# Patient Record
Sex: FEMALE | Race: WHITE | NOT HISPANIC OR LATINO | Employment: UNEMPLOYED | URBAN - METROPOLITAN AREA
[De-identification: names, ages, dates, MRNs, and addresses within clinical notes are randomized per-mention and may not be internally consistent; named-entity substitution may affect disease eponyms.]

---

## 2020-12-08 ENCOUNTER — HOSPITAL ENCOUNTER (OUTPATIENT)
Dept: URGENT CARE | Facility: CLINIC | Age: 40
Discharge: HOME | End: 2020-12-08
Attending: NURSE PRACTITIONER

## 2021-11-04 ENCOUNTER — HOSPITAL ENCOUNTER (EMERGENCY)
Facility: HOSPITAL | Age: 41
Discharge: HOME | End: 2021-11-04
Attending: EMERGENCY MEDICINE | Admitting: EMERGENCY MEDICINE

## 2021-11-04 ENCOUNTER — APPOINTMENT (OUTPATIENT)
Dept: CT IMAGING | Facility: HOSPITAL | Age: 41
End: 2021-11-04

## 2021-11-04 VITALS
HEART RATE: 87 BPM | RESPIRATION RATE: 18 BRPM | OXYGEN SATURATION: 99 % | SYSTOLIC BLOOD PRESSURE: 121 MMHG | WEIGHT: 129.19 LBS | TEMPERATURE: 98 F | BODY MASS INDEX: 23.77 KG/M2 | HEIGHT: 62 IN | DIASTOLIC BLOOD PRESSURE: 80 MMHG

## 2021-11-04 DIAGNOSIS — R10.84 GENERALIZED ABDOMINAL PAIN: Primary | ICD-10-CM

## 2021-11-04 DIAGNOSIS — F15.10 METHAMPHETAMINE ABUSE (HCC): ICD-10-CM

## 2021-11-04 LAB
027 TOXIN: NORMAL
ALBUMIN SERPL-MCNC: 4.6 G/DL (ref 3.5–5.2)
ALBUMIN/GLOB SERPL: 1.4 G/DL
ALP SERPL-CCNC: 100 U/L (ref 39–117)
ALT SERPL W P-5'-P-CCNC: 11 U/L (ref 1–33)
AMPHET+METHAMPHET UR QL: POSITIVE
ANION GAP SERPL CALCULATED.3IONS-SCNC: 12 MMOL/L (ref 5–15)
AST SERPL-CCNC: 14 U/L (ref 1–32)
BARBITURATES UR QL SCN: NEGATIVE
BASOPHILS # BLD AUTO: 0.05 10*3/MM3 (ref 0–0.2)
BASOPHILS NFR BLD AUTO: 0.2 % (ref 0–1.5)
BENZODIAZ UR QL SCN: NEGATIVE
BILIRUB SERPL-MCNC: 0.3 MG/DL (ref 0–1.2)
BILIRUB UR QL STRIP: NEGATIVE
BUN SERPL-MCNC: 9 MG/DL (ref 6–20)
BUN/CREAT SERPL: 12.5 (ref 7–25)
C DIFF TOX GENS STL QL NAA+PROBE: NEGATIVE
CALCIUM SPEC-SCNC: 9.7 MG/DL (ref 8.6–10.5)
CANNABINOIDS SERPL QL: NEGATIVE
CHLORIDE SERPL-SCNC: 103 MMOL/L (ref 98–107)
CLARITY UR: ABNORMAL
CO2 SERPL-SCNC: 22 MMOL/L (ref 22–29)
COCAINE UR QL: NEGATIVE
COLOR UR: YELLOW
CREAT SERPL-MCNC: 0.72 MG/DL (ref 0.57–1)
DEPRECATED RDW RBC AUTO: 42.5 FL (ref 37–54)
EOSINOPHIL # BLD AUTO: 0.1 10*3/MM3 (ref 0–0.4)
EOSINOPHIL NFR BLD AUTO: 0.5 % (ref 0.3–6.2)
ERYTHROCYTE [DISTWIDTH] IN BLOOD BY AUTOMATED COUNT: 12.6 % (ref 12.3–15.4)
GFR SERPL CREATININE-BSD FRML MDRD: 88 ML/MIN/1.73
GLOBULIN UR ELPH-MCNC: 3.2 GM/DL
GLUCOSE SERPL-MCNC: 116 MG/DL (ref 65–99)
GLUCOSE UR STRIP-MCNC: NEGATIVE MG/DL
HCT VFR BLD AUTO: 44.1 % (ref 34–46.6)
HGB BLD-MCNC: 14.9 G/DL (ref 12–15.9)
HGB UR QL STRIP.AUTO: NEGATIVE
HOLD SPECIMEN: NORMAL
HOLD SPECIMEN: NORMAL
IMM GRANULOCYTES # BLD AUTO: 0.09 10*3/MM3 (ref 0–0.05)
IMM GRANULOCYTES NFR BLD AUTO: 0.4 % (ref 0–0.5)
KETONES UR QL STRIP: NEGATIVE
LEUKOCYTE ESTERASE UR QL STRIP.AUTO: NEGATIVE
LIPASE SERPL-CCNC: 25 U/L (ref 13–60)
LYMPHOCYTES # BLD AUTO: 1.58 10*3/MM3 (ref 0.7–3.1)
LYMPHOCYTES NFR BLD AUTO: 7.7 % (ref 19.6–45.3)
MCH RBC QN AUTO: 30.8 PG (ref 26.6–33)
MCHC RBC AUTO-ENTMCNC: 33.8 G/DL (ref 31.5–35.7)
MCV RBC AUTO: 91.3 FL (ref 79–97)
METHADONE UR QL SCN: NEGATIVE
MONOCYTES # BLD AUTO: 0.79 10*3/MM3 (ref 0.1–0.9)
MONOCYTES NFR BLD AUTO: 3.8 % (ref 5–12)
NEUTROPHILS NFR BLD AUTO: 17.92 10*3/MM3 (ref 1.7–7)
NEUTROPHILS NFR BLD AUTO: 87.4 % (ref 42.7–76)
NITRITE UR QL STRIP: NEGATIVE
NRBC BLD AUTO-RTO: 0 /100 WBC (ref 0–0.2)
OPIATES UR QL: NEGATIVE
OXYCODONE UR QL SCN: NEGATIVE
PH UR STRIP.AUTO: 5.5 [PH] (ref 5–8)
PLATELET # BLD AUTO: 455 10*3/MM3 (ref 140–450)
PMV BLD AUTO: 9.3 FL (ref 6–12)
POTASSIUM SERPL-SCNC: 3.9 MMOL/L (ref 3.5–5.2)
PROT SERPL-MCNC: 7.8 G/DL (ref 6–8.5)
PROT UR QL STRIP: NEGATIVE
RBC # BLD AUTO: 4.83 10*6/MM3 (ref 3.77–5.28)
SODIUM SERPL-SCNC: 137 MMOL/L (ref 136–145)
SP GR UR STRIP: 1.01 (ref 1–1.03)
UROBILINOGEN UR QL STRIP: ABNORMAL
WBC # BLD AUTO: 20.53 10*3/MM3 (ref 3.4–10.8)
WHOLE BLOOD HOLD SPECIMEN: NORMAL
WHOLE BLOOD HOLD SPECIMEN: NORMAL

## 2021-11-04 PROCEDURE — 80307 DRUG TEST PRSMV CHEM ANLYZR: CPT | Performed by: EMERGENCY MEDICINE

## 2021-11-04 PROCEDURE — 83690 ASSAY OF LIPASE: CPT | Performed by: EMERGENCY MEDICINE

## 2021-11-04 PROCEDURE — 80053 COMPREHEN METABOLIC PANEL: CPT | Performed by: EMERGENCY MEDICINE

## 2021-11-04 PROCEDURE — 99283 EMERGENCY DEPT VISIT LOW MDM: CPT

## 2021-11-04 PROCEDURE — 87493 C DIFF AMPLIFIED PROBE: CPT | Performed by: EMERGENCY MEDICINE

## 2021-11-04 PROCEDURE — 74177 CT ABD & PELVIS W/CONTRAST: CPT

## 2021-11-04 PROCEDURE — 81003 URINALYSIS AUTO W/O SCOPE: CPT | Performed by: EMERGENCY MEDICINE

## 2021-11-04 PROCEDURE — 25010000002 DICYCLOMINE PER 20 MG: Performed by: EMERGENCY MEDICINE

## 2021-11-04 PROCEDURE — 85025 COMPLETE CBC W/AUTO DIFF WBC: CPT | Performed by: EMERGENCY MEDICINE

## 2021-11-04 PROCEDURE — 96372 THER/PROPH/DIAG INJ SC/IM: CPT

## 2021-11-04 PROCEDURE — 0 IOPAMIDOL PER 1 ML: Performed by: EMERGENCY MEDICINE

## 2021-11-04 PROCEDURE — 25010000002 KETOROLAC TROMETHAMINE PER 15 MG: Performed by: EMERGENCY MEDICINE

## 2021-11-04 PROCEDURE — 36415 COLL VENOUS BLD VENIPUNCTURE: CPT | Performed by: EMERGENCY MEDICINE

## 2021-11-04 PROCEDURE — 87506 IADNA-DNA/RNA PROBE TQ 6-11: CPT | Performed by: EMERGENCY MEDICINE

## 2021-11-04 PROCEDURE — 96374 THER/PROPH/DIAG INJ IV PUSH: CPT

## 2021-11-04 RX ORDER — DICYCLOMINE HYDROCHLORIDE 10 MG/ML
20 INJECTION INTRAMUSCULAR ONCE
Status: COMPLETED | OUTPATIENT
Start: 2021-11-04 | End: 2021-11-04

## 2021-11-04 RX ORDER — SODIUM CHLORIDE 0.9 % (FLUSH) 0.9 %
10 SYRINGE (ML) INJECTION AS NEEDED
Status: DISCONTINUED | OUTPATIENT
Start: 2021-11-04 | End: 2021-11-04 | Stop reason: HOSPADM

## 2021-11-04 RX ORDER — KETOROLAC TROMETHAMINE 30 MG/ML
30 INJECTION, SOLUTION INTRAMUSCULAR; INTRAVENOUS ONCE
Status: COMPLETED | OUTPATIENT
Start: 2021-11-04 | End: 2021-11-04

## 2021-11-04 RX ORDER — ONDANSETRON 8 MG/1
8 TABLET, ORALLY DISINTEGRATING ORAL EVERY 8 HOURS PRN
Qty: 20 TABLET | Refills: 0 | Status: SHIPPED | OUTPATIENT
Start: 2021-11-04

## 2021-11-04 RX ORDER — DICYCLOMINE HCL 20 MG
20 TABLET ORAL 4 TIMES DAILY PRN
Qty: 20 TABLET | Refills: 0 | Status: SHIPPED | OUTPATIENT
Start: 2021-11-04

## 2021-11-04 RX ADMIN — SODIUM CHLORIDE 1000 ML: 9 INJECTION, SOLUTION INTRAVENOUS at 14:23

## 2021-11-04 RX ADMIN — KETOROLAC TROMETHAMINE 30 MG: 30 INJECTION, SOLUTION INTRAMUSCULAR at 14:23

## 2021-11-04 RX ADMIN — DICYCLOMINE HYDROCHLORIDE 20 MG: 20 INJECTION, SOLUTION INTRAMUSCULAR at 14:23

## 2021-11-04 RX ADMIN — IOPAMIDOL 100 ML: 755 INJECTION, SOLUTION INTRAVENOUS at 16:59

## 2021-11-04 NOTE — DISCHARGE INSTRUCTIONS
Rest at home.  Drink plenty of fluids.  [place] diet.  Avoid illegal or prescription drug abuse  Follow-up with primary care in 1 week if symptoms or not better.  Return to the ER for increasing abdominal pain, persistent vomiting, passing blood in vomit or stool or any other concerns issues that may arise.

## 2021-11-04 NOTE — ED PROVIDER NOTES
Time: 1:48 PM EDT  Arrived by: Private car  Chief Complaint:   Chief Complaint   Patient presents with   • Abdominal Pain     History provided by: Patient and family  History is limited by: N/A     History of Present Illness:    Lauren Lopez is a 41 y.o. female who presents to the emergency department today with complaints of abdominal pain. Around 1000 this morning, the patient reports that she developed severe squeezing cramps to her generalized abdomen. The patient does have some relief with lying on her side. She has had mild vomiting as well as diarrhea since the onset of her pain. She denies prior instances of these symptoms.    The patient denies taking any home medications. She smokes a pack of cigarettes per day but denies alcohol use. She does have a history of methamphetamine use with last use approximately one month ago per the family, though they deny current drug use. There are no other acute complaints at this time.      History provided by:  Patient and relative   used: No    Abdominal Pain  Pain location:  Generalized  Pain quality: cramping and squeezing    Pain radiates to:  Does not radiate  Pain severity:  Severe  Onset quality:  Sudden  Duration:  4 hours  Timing:  Intermittent  Progression:  Unchanged  Chronicity:  New  Context comment:  Patient developed squeezing cramps to her abdomen this morning.  Relieved by:  Lying down (Lying on her side)  Exacerbated by: Moving from her side.  Associated symptoms: diarrhea and vomiting (mild)    Associated symptoms: no chest pain, no chills, no cough, no dysuria, no fever and no shortness of breath    Risk factors: no alcohol abuse and not elderly        Similar Symptoms Previously: No.  Recently seen: Patient's most recent visit on file was in urgent care on 12/8/2021. He received an x-ray of the right ankle during this visit, though no further information is on record at this time.      Patient Care Team  Primary Care  "Provider: Provider, No Known    Past Medical History:     No Known Allergies  History reviewed. No pertinent past medical history.  Past Surgical History:   Procedure Laterality Date   • HYSTERECTOMY     • TONSILLECTOMY       History reviewed. No pertinent family history.    Home Medications:  Prior to Admission medications    Not on File        Social History:   Social History     Tobacco Use   • Smoking status: Current Every Day Smoker   • Smokeless tobacco: Never Used   Vaping Use   • Vaping Use: Never used   Substance Use Topics   • Alcohol use: Not Currently   • Drug use: Yes     Types: Methamphetamines     Recent travel: not applicable     Review of Systems:  Review of Systems   Constitutional: Negative for chills and fever.   HENT: Negative for nosebleeds.    Eyes: Negative for redness.   Respiratory: Negative for cough and shortness of breath.    Cardiovascular: Negative for chest pain.   Gastrointestinal: Positive for abdominal pain, diarrhea and vomiting (mild).   Genitourinary: Negative for dysuria and frequency.   Musculoskeletal: Negative for back pain and neck pain.   Skin: Negative for rash.   Neurological: Negative for seizures and syncope.   All other systems reviewed and are negative.       Physical Exam:  /79   Pulse 90   Temp 98.3 °F (36.8 °C) (Oral)   Resp 18   Ht 157.5 cm (62\")   Wt 58.6 kg (129 lb 3 oz)   SpO2 98%   BMI 23.63 kg/m²     Physical Exam  Vitals and nursing note reviewed.   Constitutional:       General: She is in acute distress.      Comments: She is lying on her left side. She is in quite a bit of distress from her abdominal pain.   HENT:      Head: Normocephalic and atraumatic.      Nose: Nose normal.      Mouth/Throat:      Mouth: Mucous membranes are moist.   Eyes:      General: No scleral icterus.  Cardiovascular:      Rate and Rhythm: Normal rate and regular rhythm.      Heart sounds: Normal heart sounds. No murmur heard.      Pulmonary:      Effort: No " respiratory distress.      Breath sounds: Normal breath sounds.      Comments: She is hyperventilating due to pain.  Abdominal:      General: Bowel sounds are normal.      Palpations: Abdomen is soft.      Tenderness: There is generalized abdominal tenderness. There is no guarding or rebound.   Musculoskeletal:         General: No tenderness. Normal range of motion.      Cervical back: Normal range of motion and neck supple.      Right lower leg: No edema.      Left lower leg: No edema.   Skin:     General: Skin is warm and dry.   Neurological:      Mental Status: She is alert. Mental status is at baseline.   Psychiatric:         Behavior: Behavior normal.                Medications in the Emergency Department:  Medications   sodium chloride 0.9 % flush 10 mL (has no administration in time range)   sodium chloride 0.9 % bolus 1,000 mL (0 mL Intravenous Stopped 11/4/21 1520)   ketorolac (TORADOL) injection 30 mg (30 mg Intravenous Given 11/4/21 1423)   dicyclomine (BENTYL) injection 20 mg (20 mg Intramuscular Given 11/4/21 1423)   iopamidol (ISOVUE-370) 76 % injection 100 mL (100 mL Intravenous Given 11/4/21 1659)        Labs  Labs Reviewed   COMPREHENSIVE METABOLIC PANEL - Abnormal; Notable for the following components:       Result Value    Glucose 116 (*)     All other components within normal limits    Narrative:     GFR Normal >60  Chronic Kidney Disease <60  Kidney Failure <15     URINALYSIS W/ MICROSCOPIC IF INDICATED (NO CULTURE) - Abnormal; Notable for the following components:    Appearance, UA Turbid (*)     All other components within normal limits    Narrative:     Urine microscopic not indicated.   CBC WITH AUTO DIFFERENTIAL - Abnormal; Notable for the following components:    WBC 20.53 (*)     Platelets 455 (*)     Neutrophil % 87.4 (*)     Lymphocyte % 7.7 (*)     Monocyte % 3.8 (*)     Neutrophils, Absolute 17.92 (*)     Immature Grans, Absolute 0.09 (*)     All other components within normal limits    URINE DRUG SCREEN - Abnormal; Notable for the following components:    Amphet/Methamphet, Screen Positive (*)     All other components within normal limits    Narrative:     Negative Thresholds Per Drugs Screened:    Amphetamines                 500 ng/ml  Barbiturates                 200 ng/ml  Benzodiazepines              100 ng/ml  Cocaine                      300 ng/ml  Methadone                    300 ng/ml  Opiates                      300 ng/ml  Oxycodone                    100 ng/ml  THC                           50 ng/ml    The Normal Value for all drugs tested is negative. This report includes final unconfirmed screening results to be used for medical treatment purposes only. Unconfirmed results must not be used for non-medical purposes such as employment or legal testing. Clinical consideration should be applied to any drug of abuse test, particularly when unconfirmed results are used.           LIPASE - Normal   ENTERIC BACTERIAL PANEL (GARRY)   ENTERIC PARASITE PANEL (GARRY)   CLOSTRIDIUM DIFFICILE TOXIN, PCR   RAINBOW DRAW    Narrative:     The following orders were created for panel order [place] Draw.  Procedure                               Abnormality         Status                     ---------                               -----------         ------                     Green Top (Gel)[[id number]]                                  Final result               Lavender Top[[id number]]                                     Final result               Gold Top - SST[[id number]]                                   Final result               Light Blue Top[[id number]]                                   Final result                 Please view results for these tests on the individual orders.   CBC AND DIFFERENTIAL    Narrative:     The following orders were created for panel order CBC & Differential.  Procedure                               Abnormality         Status                     ---------                                -----------         ------                     CBC Auto Differential[[id number]]        Abnormal            Final result                 Please view results for these tests on the individual orders.   GREEN TOP   LAVENDER TOP   GOLD TOP - SST   LIGHT BLUE TOP        Imaging:  CT Abdomen Pelvis With Contrast    Result Date: 11/4/2021  PROCEDURE: CT ABDOMEN PELVIS W CONTRAST  COMPARISON: [facility], CT, ABDOMEN/PELVIS WITH CONTRAST, 8/13/2015, 22:59.  INDICATIONS: right abdominal pain since earlier today  PROTOCOL:   Standard imaging protocol performed    RADIATION:   DLP: 479.1mGy*cm   Automated exposure control was utilized to minimize radiation dose. CONTRAST: 100cc Isovue 370 I.V.  TECHNIQUE: Axial images of the abdomen and pelvis with intravenous and oral contrast.  ABDOMEN:  The lung bases are clear.  The liver, spleen, pancreas, adrenal glands and kidneys are normal.  There are no inflammatory changes around the gallbladder.  PELVIS:  No evidence of bowel obstruction, perforation or abscess.  The appendix and terminal ileum are normal.  Prior hysterectomy.  There is reversal of the jejunoileal fold pattern.  No CT evidence of colitis.  The abdominal aorta has a normal caliber.  IMPRESSION:  Reversal of the jejunoileal fold pattern.  Suggest correlation with any history of Celiac disease.  LISE NOEL MD       Electronically Signed and Approved By: LISE NOEL MD on 11/04/2021 at 17:23               Procedures:  Procedures    Progress                      The patient was seen evaluated ED by me.  The above history and physical examination was performed as document.  The diagnostic data is obtained.  Results reviewed.  Discussed with the patient.  Upon repeat examination at 1815 the patient's pain is dramatically improved she is resting comfortably.  Upon direct questioning again now the patient changes her story and does admit to recent methamphetamine use.  I explained her the CT  showed no evidence of any acute intra abdominal pathology.  Patient for discharge home with outpatient follow-up.      Medical Decision Making:  MDM     Final diagnoses:   Generalized abdominal pain   Methamphetamine abuse (HCC)        Disposition:  ED Disposition     ED Disposition Condition Comment    Discharge Stable           Documentation assistance provided by Idania Ewing acting as scribe for Dr. Luis Manuel Acuña. Information recorded by the scribe was done at my direction and has been verified and validated by me.        Idania Ewing  11/04/21 1414       Idania Ewing  11/04/21 1422       Idania Ewing  11/04/21 1708       Luis Manuel Acuña DO  11/04/21 [id number]

## 2021-11-05 LAB
C COLI+JEJ+UPSA DNA STL QL NAA+NON-PROBE: NOT DETECTED
CRYPTOSP DNA STL QL NAA+NON-PROBE: NOT DETECTED
E HISTOLYT DNA STL QL NAA+NON-PROBE: NOT DETECTED
EC STX1+STX2 GENES STL QL NAA+NON-PROBE: NOT DETECTED
G LAMBLIA DNA STL QL NAA+NON-PROBE: NOT DETECTED
S ENT+BONG DNA STL QL NAA+NON-PROBE: NOT DETECTED
SHIGELLA SP+EIEC IPAH ST NAA+NON-PROBE: NOT DETECTED

## 2022-10-21 ENCOUNTER — OFFICE VISIT (OUTPATIENT)
Dept: ORTHOPEDIC SURGERY | Facility: CLINIC | Age: 42
End: 2022-10-21

## 2022-10-21 VITALS — WEIGHT: 142 LBS | HEART RATE: 109 BPM | HEIGHT: 62 IN | BODY MASS INDEX: 26.13 KG/M2 | OXYGEN SATURATION: 99 %

## 2022-10-21 DIAGNOSIS — M25.511 RIGHT SHOULDER PAIN, UNSPECIFIED CHRONICITY: Primary | ICD-10-CM

## 2022-10-21 DIAGNOSIS — S46.911A STRAIN OF RIGHT SHOULDER, INITIAL ENCOUNTER: ICD-10-CM

## 2022-10-21 PROCEDURE — 99203 OFFICE O/P NEW LOW 30 MIN: CPT | Performed by: ORTHOPAEDIC SURGERY

## 2022-10-21 RX ORDER — DICLOFENAC SODIUM 75 MG/1
75 TABLET, DELAYED RELEASE ORAL 2 TIMES DAILY
Qty: 60 TABLET | Refills: 1 | Status: SHIPPED | OUTPATIENT
Start: 2022-10-21

## 2022-10-21 NOTE — PROGRESS NOTES
"Chief Complaint  Initial Evaluation of the Right Scapula     Subjective      Lauren Lopez presents to [facility] for evaluation of the right shoulder. The patient injured her right shoulder when she took out the trash about 6 days ago. She was seen and evaluated at [facility] and placed into a sling. She reports pain with ROM. She was prescribed prednisone.     No Known Allergies     Social History     Socioeconomic History   • Marital status:    Tobacco Use   • Smoking status: Every Day     Packs/day: 1.00     Types: Cigarettes   • Smokeless tobacco: Never   Vaping Use   • Vaping Use: Never used   Substance and Sexual Activity   • Alcohol use: Not Currently   • Drug use: Not Currently     Types: Methamphetamines   • Sexual activity: Defer        Review of Systems     Objective   Vital Signs:   Pulse 109   Ht 157.5 cm (62\")   Wt 64.4 kg (142 lb)   SpO2 99%   BMI 25.97 kg/m²       Physical Exam  Constitutional:       Appearance: Normal appearance. The patient is well-developed and normal weight.   HENT:      Head: Normocephalic.      Right Ear: Hearing and external ear normal.      Left Ear: Hearing and external ear normal.      Nose: Nose normal.   Eyes:      Conjunctiva/sclera: Conjunctivae normal.   Cardiovascular:      Rate and Rhythm: Normal rate.   Pulmonary:      Effort: Pulmonary effort is normal.      Breath sounds: No wheezing or rales.   Abdominal:      Palpations: Abdomen is soft.      Tenderness: There is no abdominal tenderness.   Musculoskeletal:      Cervical back: Normal range of motion.   Skin:     Findings: No rash.   Neurological:      Mental Status: The patient is alert and oriented to person, place, and time.   Psychiatric:         Mood and Affect: Mood and affect normal.         Judgment: Judgment normal.       Ortho Exam      Right shoulder- non-tender. No skin discoloration, atrophy or swelling. Forward elevation passive 165 with pain. Abduction 125 " with pain. External Rotation 75. Internal rotation 35 with pain. Positive impingement signs. 4+ supraspinatus strength. 5/5 infraspinatus and subscapularis. Internal rotation to L-5. Negative cross arm adduction     Procedures    X-Ray Report:  Right scapula(s) X-Ray  Indication: Evaluation of right shoulder pain  AP/Lateral view(s)  Findings: no acute fracture.   Prior studies available for comparison: no         Imaging Results (Most Recent)     Procedure Component Value Units Date/Time    XR Scapula Right [[id number]] Resulted: 10/21/22 1425     Updated: 10/21/22 1427           Result Review :       No results found.           Assessment and Plan     Diagnoses and all orders for this visit:    1. Right shoulder pain, unspecified chronicity (Primary)  -     XR Scapula Right    2. Strain of right shoulder, initial encounter        Discussed the treatment plan with the patient.  I reviewed the x-rays that were obtained today with the patient. Plan for conservative treatment at this time. Prescription for diclofenac given today. Home exercises given today. May consider MRI if symptoms persist.     Call or return if worsening symptoms.    Follow Up     4 weeks      Patient was given instructions and counseling regarding her condition or for health maintenance advice. Please see specific information pulled into the AVS if appropriate.     Scribed for Giovanni Calixto MD by Kellie Rocha.  10/21/22   14:34 EDT    I have personally performed the services described in this document as scribed by the above individual and it is both accurate and complete. Giovanni Calixto MD 10/22/22

## 2022-11-17 ENCOUNTER — OFFICE VISIT (OUTPATIENT)
Dept: ORTHOPEDIC SURGERY | Facility: CLINIC | Age: 42
End: 2022-11-17

## 2022-11-17 VITALS — BODY MASS INDEX: 25.58 KG/M2 | OXYGEN SATURATION: 100 % | HEART RATE: 118 BPM | HEIGHT: 62 IN | WEIGHT: 139 LBS

## 2022-11-17 DIAGNOSIS — S46.911A STRAIN OF RIGHT SHOULDER, INITIAL ENCOUNTER: ICD-10-CM

## 2022-11-17 DIAGNOSIS — M25.511 RIGHT SHOULDER PAIN, UNSPECIFIED CHRONICITY: Primary | ICD-10-CM

## 2022-11-17 PROCEDURE — 99213 OFFICE O/P EST LOW 20 MIN: CPT | Performed by: PHYSICIAN ASSISTANT

## 2022-11-17 NOTE — PROGRESS NOTES
"Chief Complaint  Pain and Follow-up of the Right Shoulder    Subjective          Lauren Lopez is a 42 y.o. female  presents to [facility] for   History of Present Illness      Patient presents for follow-up evaluation right shoulder pain, right shoulder injury she hurt her shoulder mid October she was evaluated at [facility] placed into a sling, Dr. Calixto saw her on 10/21/2022.  He discussed conservative treatment with diclofenac and home exercises.  She states her shoulder has not improved she has been taking the diclofenac she takes 2 and 1 dose to help with her pain.  She states she is moving soon from house and states that is packing and getting ready is increasing her pain she also has pain at night she points anterior lateral shoulder as areas of worst pain.  No Known Allergies     Social History     Socioeconomic History   • Marital status:    Tobacco Use   • Smoking status: Every Day     Packs/day: 1.00     Types: Cigarettes   • Smokeless tobacco: Never   Vaping Use   • Vaping Use: Never used   Substance and Sexual Activity   • Alcohol use: Not Currently   • Drug use: Not Currently     Types: Methamphetamines   • Sexual activity: Defer        REVIEW OF SYSTEMS    Constitutional: Denies fevers, chills, weight loss  Cardiovascular: Denies chest pain, shortness of breath  Skin: Denies rashes, acute skin changes  Neurologic: Denies headache, loss of consciousness  MSK: Right shoulder pain      Objective   Vital Signs:   Pulse 118   Ht 157.5 cm (62\")   Wt 63 kg (139 lb)   SpO2 100%   BMI 25.42 kg/m²     Body mass index is 25.42 kg/m².    Physical Exam    Right shoulder: Tender to palpation anterior lateral shoulder, skin is intact.  Active forward elevation 95 with pain, active abduction 85 with pain, external rotation 75, internal rotation rotation 35.  Neurovascular intact 4+ supraspinatus 5 out of 5 infraspinatus and subscapularis, positive impingement testing, " internal rotation L5, negative crossarm adduction    Procedures    Imaging Results (Most Recent)     None           Result Review :   The following data was reviewed by: LEE Chinchilla on 11/17/2022:               Assessment and Plan    Diagnoses and all orders for this visit:    1. Right shoulder pain, unspecified chronicity (Primary)  -     MRI Shoulder Right Without Contrast; Future    2. Strain of right shoulder, initial encounter  -     MRI Shoulder Right Without Contrast; Future        Discussed diagnosis and treatment options with the patient she was advised we recommend MRI of the right shoulder for further evaluation we discussed this right shoulder steroid injection today she declined she states she does not like needles follow-up after MRI    Call or return if worsening symptoms.    Follow Up   Return for After MRI.  Patient was given instructions and counseling regarding her condition or for health maintenance advice. Please see specific information pulled into the AVS if appropriate.

## 2022-12-16 ENCOUNTER — HOSPITAL ENCOUNTER (OUTPATIENT)
Dept: MRI IMAGING | Facility: HOSPITAL | Age: 42
Discharge: HOME | End: 2022-12-16
Admitting: PHYSICIAN ASSISTANT

## 2022-12-16 DIAGNOSIS — M25.511 RIGHT SHOULDER PAIN, UNSPECIFIED CHRONICITY: ICD-10-CM

## 2022-12-16 DIAGNOSIS — S46.911A STRAIN OF RIGHT SHOULDER, INITIAL ENCOUNTER: ICD-10-CM

## 2022-12-16 PROCEDURE — 73221 MRI JOINT UPR EXTREM W/O DYE: CPT

## 2022-12-22 ENCOUNTER — OFFICE VISIT (OUTPATIENT)
Dept: ORTHOPEDIC SURGERY | Facility: CLINIC | Age: 42
End: 2022-12-22

## 2022-12-22 VITALS — BODY MASS INDEX: 25.58 KG/M2 | WEIGHT: 139 LBS | OXYGEN SATURATION: 100 % | HEIGHT: 62 IN

## 2022-12-22 DIAGNOSIS — S46.911D STRAIN OF RIGHT SHOULDER, SUBSEQUENT ENCOUNTER: ICD-10-CM

## 2022-12-22 DIAGNOSIS — M25.511 RIGHT SHOULDER PAIN, UNSPECIFIED CHRONICITY: Primary | ICD-10-CM

## 2022-12-22 DIAGNOSIS — M75.111 PARTIAL NONTRAUMATIC TEAR OF RIGHT ROTATOR CUFF: ICD-10-CM

## 2022-12-22 DIAGNOSIS — M75.51 ACUTE BURSITIS OF RIGHT SHOULDER: ICD-10-CM

## 2022-12-22 PROCEDURE — 20610 DRAIN/INJ JOINT/BURSA W/O US: CPT | Performed by: PHYSICIAN ASSISTANT

## 2022-12-22 PROCEDURE — 99213 OFFICE O/P EST LOW 20 MIN: CPT | Performed by: PHYSICIAN ASSISTANT

## 2022-12-22 RX ORDER — TRIAMCINOLONE ACETONIDE 40 MG/ML
40 INJECTION, SUSPENSION INTRA-ARTICULAR; INTRAMUSCULAR
Status: COMPLETED | OUTPATIENT
Start: 2022-12-22 | End: 2022-12-22

## 2022-12-22 RX ORDER — LIDOCAINE HYDROCHLORIDE 10 MG/ML
5 INJECTION, SOLUTION INFILTRATION; PERINEURAL
Status: COMPLETED | OUTPATIENT
Start: 2022-12-22 | End: 2022-12-22

## 2022-12-22 RX ORDER — IBUPROFEN 600 MG/1
600 TABLET ORAL EVERY 6 HOURS PRN
COMMUNITY

## 2022-12-22 RX ADMIN — TRIAMCINOLONE ACETONIDE 40 MG: 40 INJECTION, SUSPENSION INTRA-ARTICULAR; INTRAMUSCULAR at 16:14

## 2022-12-22 RX ADMIN — LIDOCAINE HYDROCHLORIDE 5 ML: 10 INJECTION, SOLUTION INFILTRATION; PERINEURAL at 16:14

## 2022-12-22 NOTE — PROGRESS NOTES
"Chief Complaint  Pain and Follow-up of the Right Shoulder    Subjective          Lauren Lopez is a 42 y.o. female  presents to [facility] for   History of Present Illness      Patient presents for follow-up evaluation of right shoulder pain, right shoulder injury, she hurt her shoulder mid October.  She was seen by Dr. Calixto on 10/21/2022, she has tried diclofenac and home exercises at last visit she had no relief with the injection and medicine was not helping her.  She has not done formal therapy and she has refused injections in the past.  Patient had MRI ordered at last visit she is here to review this today.  Patient states she has limited range of motion and pain with range of motion she also admits to pain at night after long day of activity.      No Known Allergies     Social History     Socioeconomic History   • Marital status:    Tobacco Use   • Smoking status: Every Day     Packs/day: 1.00     Types: Cigarettes   • Smokeless tobacco: Never   Vaping Use   • Vaping Use: Never used   Substance and Sexual Activity   • Alcohol use: Not Currently   • Drug use: Not Currently     Types: Methamphetamines   • Sexual activity: Defer        REVIEW OF SYSTEMS    Constitutional: Denies fevers, chills, weight loss  Cardiovascular: Denies chest pain, shortness of breath  Skin: Denies rashes, acute skin changes  Neurologic: Denies headache, loss of consciousness  MSK: Right shoulder pain      Objective   Vital Signs:   Ht 157.5 cm (62\")   Wt 63 kg (139 lb)   SpO2 100%   BMI 25.42 kg/m²     Body mass index is 25.42 kg/m².    Physical Exam    Right shoulder: Tender palpation anterior lateral shoulder, tender at the AC joint, skin is intact, active forward elevation 140 with pain, active abduction 85 with pain, external rotation 75, internal rotation 35, neurovascular intact, 4+ supraspinatus 5 out of 5 infraspinatus and subscapularis, pause impingement testing, internal " rotation L5 negative crossarm adduction.    Large Joint Arthrocentesis  Date/Time: 12/22/2022 4:14 PM  Consent given by: patient  Site marked: site marked  Timeout: Immediately prior to procedure a time out was called to verify the correct patient, procedure, equipment, support staff and site/side marked as required   Supporting Documentation  Indications: pain   Procedure Details  Location: shoulder (right) -   Needle gauge: 21g.  Medications administered: 5 mL lidocaine 1 %; 40 mg triamcinolone acetonide 40 MG/ML  Patient tolerance: patient tolerated the procedure well with no immediate complications          Imaging Results (Most Recent)     None           Result Review :   The following data was reviewed by: LEE Chinchilla on 12/22/2022:               Assessment and Plan    Diagnoses and all orders for this visit:    1. Right shoulder pain, unspecified chronicity (Primary)  -     Ambulatory Referral to Physical Therapy Evaluate and treat (2-3x/week for 6-8 weeks)    2. Strain of right shoulder, subsequent encounter  -     Ambulatory Referral to Physical Therapy Evaluate and treat (2-3x/week for 6-8 weeks)    3. Partial nontraumatic tear of right rotator cuff  -     Ambulatory Referral to Physical Therapy Evaluate and treat (2-3x/week for 6-8 weeks)    4. Acute bursitis of right shoulder  -     Ambulatory Referral to Physical Therapy Evaluate and treat (2-3x/week for 6-8 weeks)        Discussed diagnosis and treatment options with the patient we discussed conservative versus surgical intervention, we discussed right shoulder steroid injection today which she elected to have and tolerated well she will start physical therapy work on range of motion, strength follow-up in 6 weeks for recheck if no improvement may consider surgery discussion.    Call or return if worsening symptoms.    Follow Up   Return in about 6 weeks (around 2/2/2023) for Recheck.  Patient was given instructions and counseling  regarding her condition or for health maintenance advice. Please see specific information pulled into the AVS if appropriate.

## 2022-12-22 NOTE — PROGRESS NOTES
"Chief Complaint  Pain and Follow-up of the Right Shoulder    Subjective     {Problem List  Visit Diagnosis   Encounters  Notes  Medications  Labs  Result Review Imaging  Media :23}     Lauren Lopez is a 42 y.o. female  presents to [facility] for   History of Present Illness      ***      No Known Allergies     Social History     Socioeconomic History   • Marital status:    Tobacco Use   • Smoking status: Every Day     Packs/day: 1.00     Types: Cigarettes   • Smokeless tobacco: Never   Vaping Use   • Vaping Use: Never used   Substance and Sexual Activity   • Alcohol use: Not Currently   • Drug use: Not Currently     Types: Methamphetamines   • Sexual activity: Defer        REVIEW OF SYSTEMS    Constitutional: Denies fevers, chills, weight loss  Cardiovascular: Denies chest pain, shortness of breath  Skin: Denies rashes, acute skin changes  Neurologic: Denies headache, loss of consciousness  MSK: ***      Objective   Vital Signs:   Ht 157.5 cm (62\")   Wt 63 kg (139 lb)   SpO2 100%   BMI 25.42 kg/m²     Body mass index is 25.42 kg/m².    Physical Exam    ***    Large Joint Arthrocentesis  Date/Time: 12/22/2022 4:03 PM  Consent given by: patient  Site marked: site marked  Timeout: Immediately prior to procedure a time out was called to verify the correct patient, procedure, equipment, support staff and site/side marked as required   Supporting Documentation  Indications: pain   Procedure Details  Location: shoulder (right) -   Needle gauge: 21g.  Medications administered: 5 mL lidocaine 1 %; 40 mg triamcinolone acetonide 40 MG/ML  Patient tolerance: patient tolerated the procedure well with no immediate complications          Imaging Results (Most Recent)     None           Result Review :{Labs  Result Review  Imaging  Med Tab  Media  Procedures :23}   The following data was reviewed by: Eliza Sr MA on 12/22/2022:  {Data reviewed (Optional):29760:::1} "             Assessment and Plan {CC Problem List  Visit Diagnosis   ROS  Review (Popup)  Health Maintenance  Quality  BestPractice  Medications  SmartSets  SnapShot Encounters  Media :23}   Diagnoses and all orders for this visit:    1. Right shoulder pain, unspecified chronicity (Primary)  -     Ambulatory Referral to Physical Therapy Evaluate and treat (2-3x/week for 6-8 weeks)    2. Strain of right shoulder, subsequent encounter  -     Ambulatory Referral to Physical Therapy Evaluate and treat (2-3x/week for 6-8 weeks)    3. Partial nontraumatic tear of right rotator cuff  -     Ambulatory Referral to Physical Therapy Evaluate and treat (2-3x/week for 6-8 weeks)    4. Acute bursitis of right shoulder  -     Ambulatory Referral to Physical Therapy Evaluate and treat (2-3x/week for 6-8 weeks)        ***    {Ortho Plan:40615}    Follow Up {Instructions Charge Capture  Follow-up Communications :23}  Return in about 6 weeks (around 2/2/2023) for Recheck.  Patient was given instructions and counseling regarding her condition or for health maintenance advice. Please see specific information pulled into the AVS if appropriate.

## 2023-03-13 VITALS
HEART RATE: 98 BPM | OXYGEN SATURATION: 100 % | TEMPERATURE: 98.1 F | SYSTOLIC BLOOD PRESSURE: 143 MMHG | HEIGHT: 62 IN | BODY MASS INDEX: 23.69 KG/M2 | WEIGHT: 128.75 LBS | DIASTOLIC BLOOD PRESSURE: 83 MMHG | RESPIRATION RATE: 18 BRPM

## 2023-03-13 LAB
ALBUMIN SERPL-MCNC: 4.4 G/DL (ref 3.5–5.2)
ALBUMIN/GLOB SERPL: 1.4 G/DL
ALP SERPL-CCNC: 113 U/L (ref 39–117)
ALT SERPL W P-5'-P-CCNC: 12 U/L (ref 1–33)
ANION GAP SERPL CALCULATED.3IONS-SCNC: 13.9 MMOL/L (ref 5–15)
AST SERPL-CCNC: 15 U/L (ref 1–32)
BASOPHILS # BLD AUTO: 0.04 10*3/MM3 (ref 0–0.2)
BASOPHILS NFR BLD AUTO: 0.3 % (ref 0–1.5)
BILIRUB SERPL-MCNC: 0.2 MG/DL (ref 0–1.2)
BUN SERPL-MCNC: 12 MG/DL (ref 6–20)
BUN/CREAT SERPL: 14.5 (ref 7–25)
CALCIUM SPEC-SCNC: 10 MG/DL (ref 8.6–10.5)
CHLORIDE SERPL-SCNC: 98 MMOL/L (ref 98–107)
CO2 SERPL-SCNC: 24.1 MMOL/L (ref 22–29)
CREAT SERPL-MCNC: 0.83 MG/DL (ref 0.57–1)
DEPRECATED RDW RBC AUTO: 45.3 FL (ref 37–54)
EGFRCR SERPLBLD CKD-EPI 2021: 89.3 ML/MIN/1.73
EOSINOPHIL # BLD AUTO: 0.04 10*3/MM3 (ref 0–0.4)
EOSINOPHIL NFR BLD AUTO: 0.3 % (ref 0.3–6.2)
ERYTHROCYTE [DISTWIDTH] IN BLOOD BY AUTOMATED COUNT: 13.2 % (ref 12.3–15.4)
GLOBULIN UR ELPH-MCNC: 3.2 GM/DL
GLUCOSE SERPL-MCNC: 127 MG/DL (ref 65–99)
HCT VFR BLD AUTO: 40.5 % (ref 34–46.6)
HGB BLD-MCNC: 13.5 G/DL (ref 12–15.9)
IMM GRANULOCYTES # BLD AUTO: 0.06 10*3/MM3 (ref 0–0.05)
IMM GRANULOCYTES NFR BLD AUTO: 0.5 % (ref 0–0.5)
LIPASE SERPL-CCNC: 26 U/L (ref 13–60)
LYMPHOCYTES # BLD AUTO: 1.36 10*3/MM3 (ref 0.7–3.1)
LYMPHOCYTES NFR BLD AUTO: 10.9 % (ref 19.6–45.3)
MCH RBC QN AUTO: 31 PG (ref 26.6–33)
MCHC RBC AUTO-ENTMCNC: 33.3 G/DL (ref 31.5–35.7)
MCV RBC AUTO: 92.9 FL (ref 79–97)
MONOCYTES # BLD AUTO: 0.66 10*3/MM3 (ref 0.1–0.9)
MONOCYTES NFR BLD AUTO: 5.3 % (ref 5–12)
NEUTROPHILS NFR BLD AUTO: 10.31 10*3/MM3 (ref 1.7–7)
NEUTROPHILS NFR BLD AUTO: 82.7 % (ref 42.7–76)
NRBC BLD AUTO-RTO: 0 /100 WBC (ref 0–0.2)
PLATELET # BLD AUTO: 447 10*3/MM3 (ref 140–450)
PMV BLD AUTO: 9.2 FL (ref 6–12)
POTASSIUM SERPL-SCNC: 3.5 MMOL/L (ref 3.5–5.2)
PROT SERPL-MCNC: 7.6 G/DL (ref 6–8.5)
RBC # BLD AUTO: 4.36 10*6/MM3 (ref 3.77–5.28)
SODIUM SERPL-SCNC: 136 MMOL/L (ref 136–145)
WBC NRBC COR # BLD: 12.47 10*3/MM3 (ref 3.4–10.8)

## 2023-03-13 PROCEDURE — 99282 EMERGENCY DEPT VISIT SF MDM: CPT

## 2023-03-13 PROCEDURE — 83690 ASSAY OF LIPASE: CPT

## 2023-03-13 PROCEDURE — 85025 COMPLETE CBC W/AUTO DIFF WBC: CPT

## 2023-03-13 PROCEDURE — 36415 COLL VENOUS BLD VENIPUNCTURE: CPT

## 2023-03-13 PROCEDURE — 80053 COMPREHEN METABOLIC PANEL: CPT

## 2023-03-13 RX ORDER — SODIUM CHLORIDE 0.9 % (FLUSH) 0.9 %
10 SYRINGE (ML) INJECTION AS NEEDED
Status: DISCONTINUED | OUTPATIENT
Start: 2023-03-13 | End: 2023-03-14 | Stop reason: HOSPADM

## 2023-03-14 ENCOUNTER — APPOINTMENT (OUTPATIENT)
Dept: CT IMAGING | Facility: HOSPITAL | Age: 43
End: 2023-03-14
Payer: COMMERCIAL

## 2023-03-14 ENCOUNTER — HOSPITAL ENCOUNTER (EMERGENCY)
Facility: HOSPITAL | Age: 43
Discharge: LEFT BEFORE DISCHARGE | End: 2023-03-14
Admitting: EMERGENCY MEDICINE
Payer: COMMERCIAL

## 2023-03-14 DIAGNOSIS — Z53.21 ELOPED FROM EMERGENCY DEPARTMENT: Primary | ICD-10-CM

## 2023-03-14 LAB
BACTERIA UR QL AUTO: ABNORMAL /HPF
BILIRUB UR QL STRIP: NEGATIVE
CLARITY UR: CLEAR
COLOR UR: YELLOW
GLUCOSE UR STRIP-MCNC: NEGATIVE MG/DL
HGB UR QL STRIP.AUTO: ABNORMAL
HOLD SPECIMEN: NORMAL
HOLD SPECIMEN: NORMAL
HYALINE CASTS UR QL AUTO: ABNORMAL /LPF
KETONES UR QL STRIP: NEGATIVE
LEUKOCYTE ESTERASE UR QL STRIP.AUTO: NEGATIVE
NITRITE UR QL STRIP: NEGATIVE
PH UR STRIP.AUTO: 7 [PH] (ref 5–8)
PROT UR QL STRIP: NEGATIVE
RBC # UR STRIP: ABNORMAL /HPF
REF LAB TEST METHOD: ABNORMAL
SP GR UR STRIP: 1.02 (ref 1–1.03)
SQUAMOUS #/AREA URNS HPF: ABNORMAL /HPF
UROBILINOGEN UR QL STRIP: ABNORMAL
WBC # UR STRIP: ABNORMAL /HPF
WHOLE BLOOD HOLD COAG: NORMAL
WHOLE BLOOD HOLD SPECIMEN: NORMAL

## 2023-03-14 PROCEDURE — 25510000001 IOPAMIDOL PER 1 ML

## 2023-03-14 PROCEDURE — 74177 CT ABD & PELVIS W/CONTRAST: CPT

## 2023-03-14 PROCEDURE — 81001 URINALYSIS AUTO W/SCOPE: CPT

## 2023-03-14 RX ADMIN — IOPAMIDOL 91 ML: 755 INJECTION, SOLUTION INTRAVENOUS at 00:26

## 2023-03-14 NOTE — ED PROVIDER NOTES
Time: 11:11 PM EDT  Date of encounter:  3/13/2023  Independent Historian/Clinical History and Information was obtained by:   Patient  Chief Complaint   Patient presents with   • Abdominal Pain   • Nausea       History is limited by: N/A    History of Present Illness:  Patient is a 42 y.o. year old female who presents to the emergency department for evaluation of abdominal pain and nausea.  Symptoms started 1 hour prior to arrival.  Denies vomiting or diarrhea. 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 No pertinent family history.    Home Medications:  Prior to Admission medications    Medication Sig Start Date End Date Taking? Authorizing Provider   diclofenac (VOLTAREN) 50 MG EC tablet Take 1 tablet by mouth 3 (Three) Times a Day. 11/4/21   Luis Manuel Acuña DO   diclofenac (VOLTAREN) 75 MG EC tablet Take 1 tablet by mouth 2 (Two) Times a Day. 10/21/22   Giovanni Calixto MD   dicyclomine (BENTYL) 20 MG tablet Take 1 tablet by mouth 4 (Four) Times a Day As Needed (Abdominal pain/cramps). 11/4/21   Luis Manuel Acuña DO   ibuprofen (ADVIL,MOTRIN) 600 MG tablet Take 600 mg by mouth Every 6 (Six) Hours As Needed for Mild Pain.    Provider, MD Yasmin   ondansetron ODT (ZOFRAN-ODT) 8 MG disintegrating tablet Place 1 tablet on the tongue Every 8 (Eight) Hours As Needed for Nausea or Vomiting. 11/4/21   Luis Manuel Acuña DO        Social History:   Social History     Tobacco Use   • Smoking status: Every Day     Packs/day: 1.00     Types: Cigarettes   • Smokeless tobacco: Never   Vaping Use   • Vaping Use: Never used   Substance Use Topics   • Alcohol use: Not  "Currently   • Drug use: Not Currently     Types: Methamphetamines         Review of Systems:  Review of Systems   Gastrointestinal: Positive for abdominal pain and nausea.        Physical Exam:  /83   Pulse 98   Temp 98.1 °F (36.7 °C) (Oral)   Resp 18   Ht 157.5 cm (62\")   Wt 58.4 kg (128 lb 12 oz)   SpO2 100%   BMI 23.55 kg/m²     Physical Exam  HENT:      Head: Normocephalic.      Mouth/Throat:      Mouth: Mucous membranes are moist.   Eyes:      Pupils: Pupils are equal, round, and reactive to light.   Pulmonary:      Effort: Pulmonary effort is normal.   Abdominal:      General: There is no distension.      Tenderness: There is abdominal tenderness.   Musculoskeletal:      Cervical back: Neck supple.   Skin:     General: Skin is warm and dry.   Neurological:      General: No focal deficit present.      Mental Status: She is alert and oriented to person, place, and time.   Psychiatric:         Mood and Affect: Mood normal.         Behavior: Behavior normal.                  Procedures:  Procedures      Medical Decision Making:      Comorbidities that affect care:    None    External Notes reviewed:          The following orders were placed and all results were independently analyzed by me:  Orders Placed This Encounter   Procedures   • CT Abdomen Pelvis With Contrast   • [place] Draw   • Comprehensive Metabolic Panel   • Lipase   • Urinalysis With Microscopic If Indicated (No Culture) - Urine, Clean Catch   • CBC Auto Differential   • Urinalysis, Microscopic Only - Urine, Clean Catch   • Undress & Gown   • CBC & Differential   • Green Top (Gel)   • Lavender Top   • Gold Top - SST   • Light Blue Top       Medications Given in the Emergency Department:  Medications   iopamidol (ISOVUE-370) 76 % injection 100 mL (91 mL Intravenous Given 3/14/23 0026)        ED Course:    The patient was initially evaluated in the triage area where orders were placed. The patient was later dispositioned by Selina CANDELARIA" MARITZA Stearns.      The patient was advised to stay for completion of workup which includes but is not limited to communication of labs and radiological results, reassessment and plan. The patient was advised that leaving prior to disposition by a provider could result in critical findings that are not communicated to the patient.          Labs:    Lab Results (last 24 hours)     ** No results found for the last 24 hours. **           Imaging:    No Radiology Exams Resulted Within Past 24 Hours      Differential Diagnosis and Discussion:      Abdominal Pain: Based on the patient's signs and symptoms, I considered abdominal aortic aneurysm, small bowel obstruction, pancreatitis, acute cholecystitis, acute appendecitis, peptic ulcer disease, gastritis, colitis, endocrine disorders, irritable bowel syndrome and other differential diagnosis an etiology of the patient's abdominal pain.        MDM         Patient Care Considerations:          Consultants/Shared Management Plan:        Social Determinants of Health:          Disposition and Care Coordination:    Eloped: This patient has left the emergency department or waiting room with no communication to myself, nursing or administrative staff. There was no opportunity to discuss the patient's decision to leave, provide medical advice or discuss alternatives to. The staff has made efforts to locate patient without success.        Final diagnoses:   Eloped from emergency department        ED Disposition     ED Disposition   Eloped    Condition   --    Comment   --             This medical record created using voice recognition software.           Selina Stearns PA-C  03/18/23 4215

## 2023-06-13 ENCOUNTER — TELEPHONE (OUTPATIENT)
Dept: ORTHOPEDIC SURGERY | Facility: CLINIC | Age: 43
End: 2023-06-13
Payer: COMMERCIAL
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
## 2023-06-14 ENCOUNTER — OFFICE VISIT (OUTPATIENT)
Dept: PODIATRY | Facility: CLINIC | Age: 43
End: 2023-06-14
Payer: COMMERCIAL

## 2023-06-14 VITALS
WEIGHT: 130 LBS | OXYGEN SATURATION: 98 % | TEMPERATURE: 98.4 F | SYSTOLIC BLOOD PRESSURE: 123 MMHG | HEIGHT: 62 IN | DIASTOLIC BLOOD PRESSURE: 74 MMHG | HEART RATE: 105 BPM | BODY MASS INDEX: 23.92 KG/M2
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
## 2023-06-14 DIAGNOSIS — S92.301A MULTIPLE CLOSED FRACTURES OF METATARSAL BONE OF RIGHT FOOT, INITIAL ENCOUNTER: Primary | ICD-10-CM

## 2023-06-14 DIAGNOSIS — M79.671 FOOT PAIN, RIGHT: ICD-10-CM
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
## 2023-06-16 ENCOUNTER — HOSPITAL ENCOUNTER (OUTPATIENT)
Facility: HOSPITAL | Age: 43
Setting detail: HOSPITAL OUTPATIENT SURGERY
Discharge: HOME | End: 2023-06-16
Attending: PODIATRIST | Admitting: PODIATRIST
Payer: COMMERCIAL

## 2023-06-16 ENCOUNTER — ANESTHESIA EVENT (OUTPATIENT)
Dept: PERIOP | Facility: HOSPITAL | Age: 43
End: 2023-06-16
Payer: COMMERCIAL

## 2023-06-16 ENCOUNTER — APPOINTMENT (OUTPATIENT)
Dept: GENERAL RADIOLOGY | Facility: HOSPITAL | Age: 43
End: 2023-06-16
Payer: COMMERCIAL

## 2023-06-16 ENCOUNTER — ANESTHESIA (OUTPATIENT)
Dept: PERIOP | Facility: HOSPITAL | Age: 43
End: 2023-06-16
Payer: COMMERCIAL

## 2023-06-16 VITALS
OXYGEN SATURATION: 100 % | DIASTOLIC BLOOD PRESSURE: 78 MMHG | SYSTOLIC BLOOD PRESSURE: 132 MMHG | HEART RATE: 78 BPM | TEMPERATURE: 97.2 F | HEIGHT: 62 IN | WEIGHT: 126.1 LBS | RESPIRATION RATE: 18 BRPM | BODY MASS INDEX: 23.21 KG/M2

## 2023-06-16 DIAGNOSIS — S92.301A MULTIPLE CLOSED FRACTURES OF METATARSAL BONE OF RIGHT FOOT, INITIAL ENCOUNTER: ICD-10-CM

## 2023-06-16 PROCEDURE — 76000 FLUOROSCOPY <1 HR PHYS/QHP: CPT

## 2023-06-16 PROCEDURE — 25010000002 FENTANYL CITRATE (PF) 50 MCG/ML SOLUTION: Performed by: NURSE ANESTHETIST, CERTIFIED REGISTERED

## 2023-06-16 PROCEDURE — 25010000002 PROPOFOL 10 MG/ML EMULSION: Performed by: NURSE ANESTHETIST, CERTIFIED REGISTERED

## 2023-06-16 PROCEDURE — 25010000002 ONDANSETRON PER 1 MG: Performed by: NURSE ANESTHETIST, CERTIFIED REGISTERED

## 2023-06-16 PROCEDURE — 25010000002 DEXAMETHASONE PER 1 MG: Performed by: NURSE ANESTHETIST, CERTIFIED REGISTERED

## 2023-06-16 PROCEDURE — C1713 ANCHOR/SCREW BN/BN,TIS/BN: HCPCS | Performed by: PODIATRIST

## 2023-06-16 PROCEDURE — 25010000002 HYDROMORPHONE 1 MG/ML SOLUTION: Performed by: NURSE ANESTHETIST, CERTIFIED REGISTERED

## 2023-06-16 PROCEDURE — 25010000002 CEFAZOLIN IN DEXTROSE 2-4 GM/100ML-% SOLUTION: Performed by: PODIATRIST

## 2023-06-16 PROCEDURE — 25010000002 MIDAZOLAM PER 1MG: Performed by: ANESTHESIOLOGY

## 2023-06-16 DEVICE — IMPLANTABLE DEVICE
Type: IMPLANTABLE DEVICE | Site: TOE THIRD | Status: FUNCTIONAL
Brand: MICROAIRE®

## 2023-06-16 RX ORDER — CEFAZOLIN SODIUM 2 G/100ML
2 INJECTION, SOLUTION INTRAVENOUS ONCE
Status: COMPLETED | OUTPATIENT
Start: 2023-06-16 | End: 2023-06-16

## 2023-06-16 RX ORDER — DEXAMETHASONE SODIUM PHOSPHATE 4 MG/ML
INJECTION, SOLUTION INTRA-ARTICULAR; INTRALESIONAL; INTRAMUSCULAR; INTRAVENOUS; SOFT TISSUE AS NEEDED
Status: DISCONTINUED | OUTPATIENT
Start: 2023-06-16 | End: 2023-06-16 | Stop reason: SURG

## 2023-06-16 RX ORDER — SODIUM CHLORIDE, SODIUM LACTATE, POTASSIUM CHLORIDE, CALCIUM CHLORIDE 600; 310; 30; 20 MG/100ML; MG/100ML; MG/100ML; MG/100ML
9 INJECTION, SOLUTION INTRAVENOUS CONTINUOUS PRN
Status: DISCONTINUED | OUTPATIENT
Start: 2023-06-16 | End: 2023-06-16 | Stop reason: HOSPADM

## 2023-06-16 RX ORDER — PROMETHAZINE HYDROCHLORIDE 25 MG/1
25 SUPPOSITORY RECTAL ONCE AS NEEDED
Status: DISCONTINUED | OUTPATIENT
Start: 2023-06-16 | End: 2023-06-16 | Stop reason: HOSPADM

## 2023-06-16 RX ORDER — PROMETHAZINE HYDROCHLORIDE 12.5 MG/1
12.5 TABLET ORAL EVERY 8 HOURS PRN
Qty: 30 TABLET | Refills: 0 | Status: SHIPPED | OUTPATIENT
Start: 2023-06-16

## 2023-06-16 RX ORDER — ONDANSETRON 2 MG/ML
INJECTION INTRAMUSCULAR; INTRAVENOUS AS NEEDED
Status: DISCONTINUED | OUTPATIENT
Start: 2023-06-16 | End: 2023-06-16 | Stop reason: SURG

## 2023-06-16 RX ORDER — MEPERIDINE HYDROCHLORIDE 25 MG/ML
12.5 INJECTION INTRAMUSCULAR; INTRAVENOUS; SUBCUTANEOUS
Status: DISCONTINUED | OUTPATIENT
Start: 2023-06-16 | End: 2023-06-16 | Stop reason: HOSPADM

## 2023-06-16 RX ORDER — BUPIVACAINE HYDROCHLORIDE 2.5 MG/ML
INJECTION, SOLUTION EPIDURAL; INFILTRATION; INTRACAUDAL AS NEEDED
Status: DISCONTINUED | OUTPATIENT
Start: 2023-06-16 | End: 2023-06-16 | Stop reason: HOSPADM

## 2023-06-16 RX ORDER — FENTANYL CITRATE 50 UG/ML
INJECTION, SOLUTION INTRAMUSCULAR; INTRAVENOUS AS NEEDED
Status: DISCONTINUED | OUTPATIENT
Start: 2023-06-16 | End: 2023-06-16 | Stop reason: SURG

## 2023-06-16 RX ORDER — MIDAZOLAM HYDROCHLORIDE 2 MG/2ML
2 INJECTION, SOLUTION INTRAMUSCULAR; INTRAVENOUS ONCE
Status: COMPLETED | OUTPATIENT
Start: 2023-06-16 | End: 2023-06-16

## 2023-06-16 RX ORDER — OXYCODONE HYDROCHLORIDE 5 MG/1
5 TABLET ORAL
Status: COMPLETED | OUTPATIENT
Start: 2023-06-16 | End: 2023-06-16

## 2023-06-16 RX ORDER — PROPOFOL 10 MG/ML
VIAL (ML) INTRAVENOUS AS NEEDED
Status: DISCONTINUED | OUTPATIENT
Start: 2023-06-16 | End: 2023-06-16 | Stop reason: SURG

## 2023-06-16 RX ORDER — OXYCODONE HYDROCHLORIDE 5 MG/1
5 TABLET ORAL ONCE
Status: COMPLETED | OUTPATIENT
Start: 2023-06-16 | End: 2023-06-16

## 2023-06-16 RX ORDER — MAGNESIUM HYDROXIDE 1200 MG/15ML
LIQUID ORAL AS NEEDED
Status: DISCONTINUED | OUTPATIENT
Start: 2023-06-16 | End: 2023-06-16 | Stop reason: HOSPADM

## 2023-06-16 RX ORDER — HYDROCODONE BITARTRATE AND ACETAMINOPHEN 5; 325 MG/1; MG/1
1-2 TABLET ORAL EVERY 4 HOURS PRN
Qty: 30 TABLET | Refills: 0 | Status: SHIPPED | OUTPATIENT
Start: 2023-06-16

## 2023-06-16 RX ORDER — LIDOCAINE HYDROCHLORIDE 20 MG/ML
INJECTION, SOLUTION EPIDURAL; INFILTRATION; INTRACAUDAL; PERINEURAL AS NEEDED
Status: DISCONTINUED | OUTPATIENT
Start: 2023-06-16 | End: 2023-06-16 | Stop reason: SURG

## 2023-06-16 RX ORDER — PROMETHAZINE HYDROCHLORIDE 12.5 MG/1
25 TABLET ORAL ONCE AS NEEDED
Status: DISCONTINUED | OUTPATIENT
Start: 2023-06-16 | End: 2023-06-16 | Stop reason: HOSPADM

## 2023-06-16 RX ORDER — ACETAMINOPHEN 500 MG
1000 TABLET ORAL ONCE
Status: COMPLETED | OUTPATIENT
Start: 2023-06-16 | End: 2023-06-16

## 2023-06-16 RX ORDER — ONDANSETRON 2 MG/ML
4 INJECTION INTRAMUSCULAR; INTRAVENOUS ONCE AS NEEDED
Status: DISCONTINUED | OUTPATIENT
Start: 2023-06-16 | End: 2023-06-16 | Stop reason: HOSPADM

## 2023-06-16 RX ADMIN — OXYCODONE HYDROCHLORIDE 5 MG: 5 TABLET ORAL at 14:32

## 2023-06-16 RX ADMIN — DEXAMETHASONE SODIUM PHOSPHATE 4 MG: 4 INJECTION, SOLUTION INTRA-ARTICULAR; INTRALESIONAL; INTRAMUSCULAR; INTRAVENOUS; SOFT TISSUE at 12:38

## 2023-06-16 RX ADMIN — LIDOCAINE HYDROCHLORIDE 70 MG: 20 INJECTION, SOLUTION EPIDURAL; INFILTRATION; INTRACAUDAL; PERINEURAL at 12:20

## 2023-06-16 RX ADMIN — ONDANSETRON 4 MG: 2 INJECTION INTRAMUSCULAR; INTRAVENOUS at 12:40

## 2023-06-16 RX ADMIN — FENTANYL CITRATE 25 MCG: 50 INJECTION, SOLUTION INTRAMUSCULAR; INTRAVENOUS at 13:01

## 2023-06-16 RX ADMIN — ACETAMINOPHEN 1000 MG: 500 TABLET ORAL at 11:33

## 2023-06-16 RX ADMIN — FENTANYL CITRATE 25 MCG: 50 INJECTION, SOLUTION INTRAMUSCULAR; INTRAVENOUS at 13:36

## 2023-06-16 RX ADMIN — CEFAZOLIN SODIUM 2 G: 2 INJECTION, SOLUTION INTRAVENOUS at 12:00

## 2023-06-16 RX ADMIN — PROPOFOL 200 MCG/KG/MIN: 10 INJECTION, EMULSION INTRAVENOUS at 12:10

## 2023-06-16 RX ADMIN — LIDOCAINE HYDROCHLORIDE 30 MG: 20 INJECTION, SOLUTION EPIDURAL; INFILTRATION; INTRACAUDAL; PERINEURAL at 12:10

## 2023-06-16 RX ADMIN — OXYCODONE HYDROCHLORIDE 5 MG: 5 TABLET ORAL at 14:46

## 2023-06-16 RX ADMIN — HYDROMORPHONE HYDROCHLORIDE 0.5 MG: 1 INJECTION, SOLUTION INTRAMUSCULAR; INTRAVENOUS; SUBCUTANEOUS at 14:04

## 2023-06-16 RX ADMIN — PROPOFOL 100 MG: 10 INJECTION, EMULSION INTRAVENOUS at 12:10

## 2023-06-16 RX ADMIN — SODIUM CHLORIDE, POTASSIUM CHLORIDE, SODIUM LACTATE AND CALCIUM CHLORIDE 9 ML/HR: 600; 310; 30; 20 INJECTION, SOLUTION INTRAVENOUS at 11:35

## 2023-06-16 RX ADMIN — MIDAZOLAM HYDROCHLORIDE 2 MG: 1 INJECTION, SOLUTION INTRAMUSCULAR; INTRAVENOUS at 11:39

## 2023-06-16 RX ADMIN — FENTANYL CITRATE 25 MCG: 50 INJECTION, SOLUTION INTRAMUSCULAR; INTRAVENOUS at 12:57

## 2023-06-16 RX ADMIN — OXYCODONE 5 MG: 5 TABLET ORAL at 11:33

## 2023-06-16 NOTE — DISCHARGE INSTRUCTIONS
DISCHARGE INSTRUCTIONS  ORTHOPEDICS      For your surgery you had:  General anesthesia (you may have a sore throat for the first 24 hours)  IV sedation.  Local anesthesia  Monitored anesthesia care  You received a medicated patch for nausea prevention today (behind the ear). It is recommended that you remove it 24-48 hours post-operatively. It must be removed within 72 hours.   You received an anesthesia medication today that can cause hormonal forms of birth control to be ineffective. You should use a different form of birth control (to prevent pregnancy) for 7 days.   You may experience dizziness, drowsiness, or light-headedness for several hours following surgery  Do not stay alone today or tonight.  Limit your activity for 24 hours.  Resume your diet slowly.  Follow whatever special dietary instructions you may have been given by the doctor.  You should not drive or operate machinery or drink alcohol for 24 hours or while you are taking pain medication.  You should not sign any legally binding documents.  If you had an axillary or regional block, you will not have control of the involved limb for up to 12 hours.  Protect the arm with a sling or follow your physician's specific instructions.  You may remove dressing:  [] in 24 hours  [] in 48 hours  [] Other:    You may shower or bathe:    Sleep with the injured part elevated on a pillow.  Medications per physician's instructions as indicated on Discharge Medication Information Sheet.  Follow verbal instructions of your doctor.  Carry the upper arm in a sling so that the hand and wrist are above the level of the heart.  Sit with the lower leg propped up on a footstool or chair with pillows.  Exercise fingers or toes for 10 minutes every hour while awake. Ice bag to injured area for 72 hours.  Apply 20 minutes on - 20 minutes off.  Never place ice directly on skin or cast.    Avoid getting cast or dressing wet.  The Cold Therapy System can help reduce swelling  and decrease pain.  Utilize device for 30-60 minutes per session, with 30-60 minute breaks in between sessions.  It is recommended to use, as directed, for the first 72 hours after surgery until bedtime.  After 72 hours, continue using the device as needed until your follow-up appointment with your physician.  Never place directly on skin.  Please refer to the instruction sheet given.  In addition to these instructions, follow the discharge instructions on postoperative arthroscopic surgery.  SPECIAL INSTRUCTIONS:           Last dose of pain medication was given at:    NOTIFY THE PHYSICIAN IF YOU EXPERIENCE:  Numbness of fingers or toes.  Inability to move fingers or toes.  Extreme coldness, paleness or blue dis-coloration of fingers or toes.  Excessive swelling of affected surgical site or swelling that causes the cast to rub or cut into skin.  Pain unrelieved by pain medication  Nausea/vomiting not relieved by prescribed medication  Unable to urinate in 6 hours after surgery  Temperature greater than 101 degree Fahrenheit or chills  If unable to reach your doctor, please go to the closest emergency room  You should see   for follow-up care   on   .   Phone number:

## 2023-06-16 NOTE — ANESTHESIA POSTPROCEDURE EVALUATION
Patient: Lauren Lopez    Procedure Summary       Date: 06/16/23 Room / Location: [facility] OR 05 / [facility] MAIN OR    Anesthesia Start: 1153 Anesthesia Stop: 1356    Procedure: FOOT OPEN REDUCTION - Right 3rd & 4th metatarsals shafts (Right) Diagnosis:       Multiple closed fractures of metatarsal bone of right foot, initial encounter      (Multiple closed fractures of metatarsal bone of right foot, initial encounter [S92.301A])    Surgeons: Donny Warner DPM Provider: Reyes, Mirabelle, DO    Anesthesia Type: general ASA Status: 1            Anesthesia Type: general    Vitals  Vitals Value Taken Time   /85 06/16/23 1441   Temp 36.2 °C (97.2 °F) 06/16/23 1430   Pulse 86 06/16/23 1443   Resp 14 06/16/23 1435   SpO2 98 % 06/16/23 1443   Vitals shown include unvalidated device data.        Post Anesthesia Care and Evaluation    Patient location during evaluation: bedside  Patient participation: complete - patient participated  Level of consciousness: awake  Pain management: adequate    Airway patency: patent  PONV Status: none  Cardiovascular status: acceptable and stable  Respiratory status: acceptable  Hydration status: acceptable    Comments: An Anesthesiologist personally participated in the most demanding procedures (including induction and emergence if applicable) in the anesthesia plan, monitored the course of anesthesia administration at frequent intervals and remained physically present and available for immediate diagnosis and treatment of emergencies.

## 2023-06-16 NOTE — ANESTHESIA PREPROCEDURE EVALUATION
Anesthesia Evaluation     Patient summary reviewed and Nursing notes reviewed   no history of anesthetic complications:  NPO Solid Status: > 8 hours  NPO Liquid Status: > 2 hours           Airway   Mallampati: II  TM distance: >3 FB  Neck ROM: full  No difficulty expected  Dental      Pulmonary - negative pulmonary ROS and normal exam    breath sounds clear to auscultation  Cardiovascular - negative cardio ROS and normal exam  Exercise tolerance: good (4-7 METS)    Rhythm: regular  Rate: normal        Neuro/Psych- negative ROS  GI/Hepatic/Renal/Endo - negative ROS     Musculoskeletal     Abdominal    Substance History - negative use     OB/GYN negative ob/gyn ROS         Other   arthritis,      ROS/Med Hx Other: PAT Nursing Notes unavailable.                   Anesthesia Plan    ASA 1     general     (Patient understands anesthesia not responsible for dental damage.)  intravenous induction     Anesthetic plan, risks, benefits, and alternatives have been provided, discussed and informed consent has been obtained with: patient.  Pre-procedure education provided  Plan discussed with CRNA.        CODE STATUS:

## 2023-06-16 NOTE — OP NOTE
"PREOPERATIVE DIAGNOSES:  Fractures of Right 3rd and 4th metatarsal shaft fractures     PROCEDURES PERFORMED:  Open reduction of 3rd and 4th metatarsal shaft fractures of with K-wires     ANESTHESIA:  Local with MAC      HEMOSTASIS:  none     ESTIMATED BLOOD LOSS:  10 mL     SPECIMENS:  none      DRAINS:  none     COMPLICATIONS:  None.     IMPLANTS:  0.045\" K-wires with pin implants     SUTURES:  3-0 Vicryl  3-0 Nylon      INJECTABLES:  20 mL of 0.25% Marcaine plain     DESCRIPTION OF PROCEDURE:  Written consent was obtained from the patient prior to any medications or sedation being administered.     Under mild sedation, the patient was brought to the operating room and placed on the operating table in the supine position.      Following IV anesthesia, local anesthesia was obtained around the right ankle utilizing a total of 20 mL of 0.25% Marcaine plain.      The foot was then scrubbed, prepped and draped in the usual aseptic manner.  An Esmarch bandage was utilized to  exsanguinate the patient's foot with a total pressure of 250 mmHg.     Fractures were visualized with C arm.  Displaced fractures are seen in the third and fourth metatarsal shafts a nondisplaced fracture seen in the second metatarsal shaft    Attention was directed to the third metatarsal shaft.  Closed reduction was attempted with a K wire and wire  with the C arm.  Proper reduction could not be obtained so a linear incision was made dorsal and parallel to the third metatarsal shaft.    The incision was deepened through the subcutaneous tissues down to the periosteal layer using combination of sharp and blunt dissection.    Care was taken to identify and retract all vital and neurovascular structures.  All bleeders were ligated and cauterized as  necessary.    With use of the C arm and wire , a 0.045 inch K wire was utilized to reduce the fracture in the third metatarsal shaft.    Use of the original incision, with use of a C arm and " wire , a 0.045 inch K wire was utilized to reduce the fracture in the fourth metatarsal shaft.    C-arm was utilized to verify proper reduction in the third and fourth metatarsal shafts along with no change in the nondisplaced fracture in the second metatarsal shaft.     The wound was flushed with copious amounts of sterile normal saline.    Subcutaneous tissues reapproximated coapted utilizing 3-0 Vicryl     The skin edges were approximated and coapted utilizing 3-0 nylon.     Status was evaluated with capillary fill time immediate to toes 1 through 5 on the right foot.     The surgical site was dressed with a sterile compressive dressings consisting of 4 x 4's, Kerlix and Coban.    A posterior splint cast was applied with cast padding and 4 inch Ortho-Glass and an Ace wrap with the right foot at 90 degrees to the ankle.     The patient tolerated the procedure and anesthesia well. The patient was transferred to the recovery room with vital signs stable and vascular status intact to toes one through five on the surgical limb.      Following a period of postoperative monitoring, the patient will be discharged home, having been given written and oral postoperative instructions. The patient is to contact Dr. Warner for postoperative followup care and if any problems should arise.

## 2023-07-31 ENCOUNTER — OFFICE VISIT (OUTPATIENT)
Dept: PODIATRY | Facility: CLINIC | Age: 43
End: 2023-07-31
Payer: COMMERCIAL

## 2023-07-31 VITALS
HEART RATE: 110 BPM | WEIGHT: 126 LBS | SYSTOLIC BLOOD PRESSURE: 129 MMHG | BODY MASS INDEX: 23.19 KG/M2 | OXYGEN SATURATION: 98 % | DIASTOLIC BLOOD PRESSURE: 87 MMHG | HEIGHT: 62 IN | TEMPERATURE: 98.2 F

## 2023-07-31 DIAGNOSIS — M79.671 FOOT PAIN, RIGHT: Primary | ICD-10-CM
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
## 2023-07-31 DIAGNOSIS — S92.301D MULTIPLE CLOSED FRACTURES OF METATARSAL BONE OF RIGHT FOOT WITH ROUTINE HEALING, SUBSEQUENT ENCOUNTER: ICD-10-CM

## 2024-04-18 ENCOUNTER — OFFICE VISIT (OUTPATIENT)
Dept: ORTHOPEDIC SURGERY | Facility: CLINIC | Age: 44
End: 2024-04-18
Payer: COMMERCIAL

## 2024-04-18 VITALS — HEART RATE: 94 BPM | WEIGHT: 150 LBS | OXYGEN SATURATION: 98 % | HEIGHT: 62 IN | BODY MASS INDEX: 27.6 KG/M2

## 2024-04-18 DIAGNOSIS — M22.2X1 PATELLOFEMORAL PAIN SYNDROME OF RIGHT KNEE: ICD-10-CM

## 2024-04-18 DIAGNOSIS — M25.561 RIGHT KNEE PAIN, UNSPECIFIED CHRONICITY: Primary | ICD-10-CM
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
## 2025-05-05 ENCOUNTER — TRANSCRIBE ORDERS (OUTPATIENT)
Dept: ADMINISTRATIVE | Facility: HOSPITAL | Age: 45
End: 2025-05-05
Payer: COMMERCIAL

## 2025-05-05 DIAGNOSIS — Z13.820 SCREENING FOR OSTEOPOROSIS: Primary | ICD-10-CM

## 2025-05-05 DIAGNOSIS — Z12.31 VISIT FOR SCREENING MAMMOGRAM: Primary | ICD-10-CM

## 2025-05-30 ENCOUNTER — HOSPITAL ENCOUNTER (EMERGENCY)
Facility: HOSPITAL | Age: 45
Discharge: HOME | End: 2025-05-30
Attending: EMERGENCY MEDICINE
Payer: COMMERCIAL

## 2025-05-30 ENCOUNTER — APPOINTMENT (OUTPATIENT)
Dept: GENERAL RADIOLOGY | Facility: HOSPITAL | Age: 45
End: 2025-05-30
Payer: COMMERCIAL

## 2025-05-30 VITALS
DIASTOLIC BLOOD PRESSURE: 65 MMHG | OXYGEN SATURATION: 98 % | BODY MASS INDEX: 28.36 KG/M2 | HEIGHT: 62 IN | WEIGHT: 154.1 LBS | RESPIRATION RATE: 20 BRPM | HEART RATE: 76 BPM | SYSTOLIC BLOOD PRESSURE: 132 MMHG | TEMPERATURE: 98.4 F

## 2025-05-30 DIAGNOSIS — M70.20 OLECRANON BURSITIS, UNSPECIFIED LATERALITY: Primary | ICD-10-CM

## 2025-05-30 LAB
ALBUMIN SERPL-MCNC: 4.2 G/DL (ref 3.5–5.2)
ALBUMIN/GLOB SERPL: 1.4 G/DL
ALP SERPL-CCNC: 111 U/L (ref 39–117)
ALT SERPL W P-5'-P-CCNC: 49 U/L (ref 1–33)
ANION GAP SERPL CALCULATED.3IONS-SCNC: 9.9 MMOL/L (ref 5–15)
AST SERPL-CCNC: 28 U/L (ref 1–32)
BASOPHILS # BLD AUTO: 0.05 10*3/MM3 (ref 0–0.2)
BASOPHILS NFR BLD AUTO: 0.4 % (ref 0–1.5)
BILIRUB SERPL-MCNC: <0.2 MG/DL (ref 0–1.2)
BUN SERPL-MCNC: 12.2 MG/DL (ref 6–20)
BUN/CREAT SERPL: 19.1 (ref 7–25)
CALCIUM SPEC-SCNC: 9.2 MG/DL (ref 8.6–10.5)
CHLORIDE SERPL-SCNC: 102 MMOL/L (ref 98–107)
CO2 SERPL-SCNC: 24.1 MMOL/L (ref 22–29)
CREAT SERPL-MCNC: 0.64 MG/DL (ref 0.57–1)
DEPRECATED RDW RBC AUTO: 47.2 FL (ref 37–54)
EGFRCR SERPLBLD CKD-EPI 2021: 109.8 ML/MIN/1.73
EOSINOPHIL # BLD AUTO: 0.14 10*3/MM3 (ref 0–0.4)
EOSINOPHIL NFR BLD AUTO: 1.1 % (ref 0.3–6.2)
ERYTHROCYTE [DISTWIDTH] IN BLOOD BY AUTOMATED COUNT: 13.3 % (ref 12.3–15.4)
GLOBULIN UR ELPH-MCNC: 3 GM/DL
GLUCOSE SERPL-MCNC: 92 MG/DL (ref 65–99)
HCT VFR BLD AUTO: 38.8 % (ref 34–46.6)
HGB BLD-MCNC: 12.5 G/DL (ref 12–15.9)
IMM GRANULOCYTES # BLD AUTO: 0.05 10*3/MM3 (ref 0–0.05)
IMM GRANULOCYTES NFR BLD AUTO: 0.4 % (ref 0–0.5)
LYMPHOCYTES # BLD AUTO: 2.38 10*3/MM3 (ref 0.7–3.1)
LYMPHOCYTES NFR BLD AUTO: 18.3 % (ref 19.6–45.3)
MCH RBC QN AUTO: 30.8 PG (ref 26.6–33)
MCHC RBC AUTO-ENTMCNC: 32.2 G/DL (ref 31.5–35.7)
MCV RBC AUTO: 95.6 FL (ref 79–97)
MONOCYTES # BLD AUTO: 1.25 10*3/MM3 (ref 0.1–0.9)
MONOCYTES NFR BLD AUTO: 9.6 % (ref 5–12)
NEUTROPHILS NFR BLD AUTO: 70.2 % (ref 42.7–76)
NEUTROPHILS NFR BLD AUTO: 9.17 10*3/MM3 (ref 1.7–7)
NRBC BLD AUTO-RTO: 0 /100 WBC (ref 0–0.2)
PLATELET # BLD AUTO: 346 10*3/MM3 (ref 140–450)
PMV BLD AUTO: 9.3 FL (ref 6–12)
POTASSIUM SERPL-SCNC: 4.3 MMOL/L (ref 3.5–5.2)
PROT SERPL-MCNC: 7.2 G/DL (ref 6–8.5)
RBC # BLD AUTO: 4.06 10*6/MM3 (ref 3.77–5.28)
SODIUM SERPL-SCNC: 136 MMOL/L (ref 136–145)
WBC NRBC COR # BLD AUTO: 13.04 10*3/MM3 (ref 3.4–10.8)

## 2025-05-30 PROCEDURE — 85025 COMPLETE CBC W/AUTO DIFF WBC: CPT

## 2025-05-30 PROCEDURE — 99283 EMERGENCY DEPT VISIT LOW MDM: CPT

## 2025-05-30 PROCEDURE — 25010000002 KETOROLAC TROMETHAMINE PER 15 MG

## 2025-05-30 PROCEDURE — 36415 COLL VENOUS BLD VENIPUNCTURE: CPT

## 2025-05-30 PROCEDURE — 80053 COMPREHEN METABOLIC PANEL: CPT

## 2025-05-30 PROCEDURE — 96372 THER/PROPH/DIAG INJ SC/IM: CPT

## 2025-05-30 PROCEDURE — 73080 X-RAY EXAM OF ELBOW: CPT

## 2025-05-30 RX ORDER — KETOROLAC TROMETHAMINE 30 MG/ML
30 INJECTION, SOLUTION INTRAMUSCULAR; INTRAVENOUS ONCE
Status: COMPLETED | OUTPATIENT
Start: 2025-05-30 | End: 2025-05-30

## 2025-05-30 RX ADMIN — KETOROLAC TROMETHAMINE 30 MG: 30 INJECTION, SOLUTION INTRAMUSCULAR; INTRAVENOUS at 12:26

## 2025-05-30 NOTE — ED PROVIDER NOTES
Time: 12:37 PM EDT  Date of encounter:  5/30/2025  Independent Historian/Clinical History and Information was obtained by:   Patient    History is limited by: N/A    Chief Complaint: right elbow pain       History of Present Illness:  Patient is a 45 y.o. year old female who presents to the emergency department for evaluation of right elbow pain that began 3 days ago after waking up.  Patient denies injury/trauma.  Patient states she has a history of tennis elbow.  Patient denies fever      Patient Care Team  Primary Care Provider: Fifi Hill MD    Past Medical History:     Allergies   Allergen Reactions    Morphine Itching     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    Chronic back pain     Foot fracture, right      Past Surgical History:   Procedure Laterality Date    CLOSED REDUCTION Right 6/16/2023    Procedure: FOOT OPEN REDUCTION - Right 3rd & 4th metatarsals shafts;  Surgeon: Donny Warner DPM;  Location: [facility];  Service: Podiatry;  Laterality: Right;    HYSTERECTOMY      TONSILLECTOMY       Family History   Problem Relation Age of Onset    Hypertension Mother     Hypertension Brother     Hypertension Maternal Grandmother     Cancer Maternal Grandmother     Alzheimer's disease Other     Malig Hyperthermia Neg Hx        Home Medications:  Prior to Admission medications    Medication Sig Start Date End Date Taking? Authorizing Provider   pantoprazole (PROTONIX) 40 MG EC tablet Take 1 tablet by mouth Daily. 5/2/25   Yasmin Umanzor MD   sertraline (ZOLOFT) 50 MG tablet Take 1 tablet by mouth Daily. 5/6/25   Yasmin Umanzor MD        Social History:   Social History     Tobacco Use    Smoking status: Former     Current packs/day: 1.00     Average packs/day: 1 pack/day for 20.0 years (20.0 ttl pk-yrs)     Types: Cigarettes    Smokeless tobacco: Never   Vaping Use    Vaping status: Former    Substances: Nicotine    Devices: Refillable tank   Substance Use  "Topics    Alcohol use: Not Currently    Drug use: Not Currently     Types: Marijuana         Review of Systems:  Review of Systems   Constitutional:  Negative for chills and fever.   HENT:  Negative for congestion, rhinorrhea and sore throat.    Eyes:  Negative for pain and visual disturbance.   Respiratory:  Negative for apnea, cough, chest tightness and shortness of breath.    Cardiovascular:  Negative for chest pain and palpitations.   Gastrointestinal:  Negative for abdominal pain, diarrhea, nausea and vomiting.   Genitourinary:  Negative for difficulty urinating and dysuria.   Musculoskeletal:  Positive for arthralgias. Negative for joint swelling and myalgias.   Skin:  Negative for color change.   Neurological:  Negative for seizures and headaches.   Psychiatric/Behavioral: Negative.     All other systems reviewed and are negative.       Physical Exam:  BP (!) 153/114 (Patient Position: Sitting)   Pulse 88   Temp 98.1 °F (36.7 °C) (Oral)   Resp 16   Ht 157.5 cm (62\")   Wt 69.9 kg (154 lb 1.6 oz)   SpO2 100%   BMI 28.19 kg/m²     Physical Exam  Vitals and nursing note reviewed.   Constitutional:       General: She is not in acute distress.     Appearance: Normal appearance. She is not toxic-appearing.   HENT:      Head: Normocephalic and atraumatic.      Jaw: There is normal jaw occlusion.   Eyes:      General: Lids are normal.      Extraocular Movements: Extraocular movements intact.      Conjunctiva/sclera: Conjunctivae normal.      Pupils: Pupils are equal, round, and reactive to light.   Cardiovascular:      Rate and Rhythm: Normal rate and regular rhythm.      Pulses: Normal pulses.      Heart sounds: Normal heart sounds.   Pulmonary:      Effort: Pulmonary effort is normal. No respiratory distress.      Breath sounds: Normal breath sounds. No wheezing or rhonchi.   Abdominal:      General: Abdomen is flat.      Palpations: Abdomen is soft.      Tenderness: There is no abdominal tenderness. There is " no guarding or rebound.   Musculoskeletal:         General: Tenderness (Mild right elbow) present.      Cervical back: Normal range of motion and neck supple.      Right lower leg: No edema.      Left lower leg: No edema.   Skin:     General: Skin is warm and dry.   Neurological:      Mental Status: She is alert and oriented to person, place, and time. Mental status is at baseline.   Psychiatric:         Mood and Affect: Mood normal.                    Medical Decision Making:      Comorbidities that affect care:    None    External Notes reviewed:          The following orders were placed and all results were independently analyzed by me:  Orders Placed This Encounter   Procedures    XR Elbow 3+ View Right    Comprehensive Metabolic Panel    CBC Auto Differential    Ambulatory Referral to Orthopedic Surgery    CBC & Differential       Medications Given in the Emergency Department:  Medications   ketorolac (TORADOL) injection 30 mg (30 mg Intramuscular Given 5/30/25 1226)        ED Course:         Labs:    Lab Results (last 24 hours)       Procedure Component Value Units Date/Time    Comprehensive Metabolic Panel [[id number]]  (Abnormal) Collected: 05/30/25 1225    Specimen: Blood Updated: 05/30/25 1252     Glucose 92 mg/dL      BUN 12.2 mg/dL      Creatinine 0.64 mg/dL      Sodium 136 mmol/L      Potassium 4.3 mmol/L      Chloride 102 mmol/L      CO2 24.1 mmol/L      Calcium 9.2 mg/dL      Total Protein 7.2 g/dL      Albumin 4.2 g/dL      ALT (SGPT) 49 U/L      AST (SGOT) 28 U/L      Alkaline Phosphatase 111 U/L      Total Bilirubin <0.2 mg/dL      Globulin 3.0 gm/dL      A/G Ratio 1.4 g/dL      BUN/Creatinine Ratio 19.1     Anion Gap 9.9 mmol/L      eGFR 109.8 mL/min/1.73     Narrative:      GFR Categories in Chronic Kidney Disease (CKD)              GFR Category          GFR (mL/min/1.73)    Interpretation  G1                    90 or greater        Normal or high (1)  G2                    60-89                 Mild decrease (1)  G3a                   45-59                Mild to moderate decrease  G3b                   30-44                Moderate to severe decrease  G4                    15-29                Severe decrease  G5                    14 or less           Kidney failure    (1)In the absence of evidence of kidney disease, neither GFR category G1 or G2 fulfill the criteria for CKD.    eGFR calculation 2021 CKD-EPI creatinine equation, which does not include race as a factor    CBC & Differential [[id number]]  (Abnormal) Collected: 05/30/25 1225    Specimen: Blood Updated: 05/30/25 1234    Narrative:      The following orders were created for panel order CBC & Differential.  Procedure                               Abnormality         Status                     ---------                               -----------         ------                     CBC Auto Differential[[id number]]        Abnormal            Final result                 Please view results for these tests on the individual orders.    CBC Auto Differential [[id number]]  (Abnormal) Collected: 05/30/25 1225    Specimen: Blood Updated: 05/30/25 1234     WBC 13.04 10*3/mm3      RBC 4.06 10*6/mm3      Hemoglobin 12.5 g/dL      Hematocrit 38.8 %      MCV 95.6 fL      MCH 30.8 pg      MCHC 32.2 g/dL      RDW 13.3 %      RDW-SD 47.2 fl      MPV 9.3 fL      Platelets 346 10*3/mm3      Neutrophil % 70.2 %      Lymphocyte % 18.3 %      Monocyte % 9.6 %      Eosinophil % 1.1 %      Basophil % 0.4 %      Immature Grans % 0.4 %      Neutrophils, Absolute 9.17 10*3/mm3      Lymphocytes, Absolute 2.38 10*3/mm3      Monocytes, Absolute 1.25 10*3/mm3      Eosinophils, Absolute 0.14 10*3/mm3      Basophils, Absolute 0.05 10*3/mm3      Immature Grans, Absolute 0.05 10*3/mm3      nRBC 0.0 /100 WBC              Imaging:    XR Elbow 3+ View Right  Result Date: 5/30/2025  XR ELBOW 3+ VW RIGHT Date of Exam: 5/30/2025 12:35 PM EDT Indication: pain Comparison: Right elbow  radiograph 5/29/2025. Findings: Elbow joint effusion is redemonstrated, manifest as elevation of the anterior posterior fat pads. No displaced fracture is seen. The elbow joint maintains satisfactory alignment. No retained opaque foreign body. No periostitis or plain film evidence of osteomyelitis.     Impression: Elbow joint effusion is redemonstrated. No displaced elbow fracture is identified. Electronically Signed: Funmi Mckeon MD  5/30/2025 1:09 PM EDT  Workstation ID: [id number]    XR Elbow 3+ View Right  Result Date: 5/29/2025  XR ELBOW 3+ VW RIGHT Date of Exam: 5/29/2025 3:50 PM EDT Indication: Pain, decreased range of motion, pain with extension Comparison: None available. Findings: There is an elbow joint effusion. Normal bone mineralization. No acute fracture or traumatic malalignment. Joint spaces are preserved. No discrete erosion or periostitis.     Impression: Elbow joint effusion. Otherwise unremarkable appearance of the elbow. Electronically Signed: Geo Powell MD  5/29/2025 4:01 PM EDT  Workstation ID: [id number]        Differential Diagnosis and Discussion:    Extremity Pain: Differential diagnosis includes but is not limited to soft tissue sprain, tendonitis, tendon injury, dislocation, fracture, deep vein thrombosis, arterial insufficiency, osteoarthritis, bursitis, and ligamentous damage.    PROCEDURES:    Labs were collected in the emergency department and all labs were reviewed and interpreted by me.  X-ray were performed in the emergency department and all X-ray impressions were independently interpreted by me.    No orders to display       Procedures    MDM     Amount and/or Complexity of Data Reviewed  Decide to obtain previous medical records or to obtain history from someone other than the patient: yes       Patient has leukocytosis at baseline.  X-ray shows Elbow joint effusion is redemonstrated.  No displaced elbow fracture is identified.  There is no erythema or warmth present to  elbow.  Patient has bursitis.  I instructed patient to return to ED if she develops any new or worsening symptoms.  Otherwise follow-up with orthopedic.  Patient states she understands and agrees with plan of care.                Patient Care Considerations:          Consultants/Shared Management Plan:    None    Social Determinants of Health:    Patient is independent, reliable, and has access to care.       Disposition and Care Coordination:    Discharged: The patient is suitable and stable for discharge with no need for consideration of admission.    I have explained the patient´s condition, diagnoses and treatment plan based on the information available to me at this time. I have answered questions and addressed any concerns. The patient has a good  understanding of the patient´s diagnosis, condition, and treatment plan as can be expected at this point. The vital signs have been stable. The patient´s condition is stable and appropriate for discharge from the emergency department.      The patient will pursue further outpatient evaluation with the primary care physician or other designated or consulting physician as outlined in the discharge instructions. They are agreeable to this plan of care and follow-up instructions have been explained in detail. The patient has received these instructions in written format and has expressed an understanding of the discharge instructions. The patient is aware that any significant change in condition or worsening of symptoms should prompt an immediate return to this or the closest emergency department or call to 911.  I have explained discharge medications and the need for follow up with the patient/caretakers. This was also printed in the discharge instructions. Patient was discharged with the following medications and follow up:      Medication List      No changes were made to your prescriptions during this visit.      Erik Salazar MD  [address] [place] [address]  [id number]  [phone]    Call in 1 day  To schedule follow-up       Final diagnoses:   Olecranon bursitis, unspecified laterality        ED Disposition       ED Disposition   Discharge    Condition   Stable    Comment   --               This medical record created using voice recognition software.             Kip Higgins PA-C  05/30/25 8710

## 2025-05-30 NOTE — ED PROVIDER NOTES
"SHARED VISIT ATTESTATION:    This visit was performed by myself and an APC.  I personally approved the management plan/medical decision making and take responsibility for the patient management.      SHARED VISIT NOTE:    Patient is 45 y.o. year old female that presents to the ED for evaluation of right elbow pain.  The patient has no trauma.  She also has no new neurologic symptoms    Physical Exam    ED Course:    /65 (BP Location: Right arm, Patient Position: Sitting)   Pulse 76   Temp 98.4 °F (36.9 °C) (Oral)   Resp 20   Ht 157.5 cm (62\")   Wt 69.9 kg (154 lb 1.6 oz)   SpO2 98%   BMI 28.19 kg/m²       The following orders were placed and all results were independently analyzed by me:  Orders Placed This Encounter   Procedures    XR Elbow 3+ View Right    Comprehensive Metabolic Panel    CBC Auto Differential    Ambulatory Referral to Orthopedic Surgery    CBC & Differential       Medications Given in the Emergency Department:  Medications   ketorolac (TORADOL) injection 30 mg (30 mg Intramuscular Given 5/30/25 1226)        ED Course:         Labs:    Lab Results (last 24 hours)       Procedure Component Value Units Date/Time    Comprehensive Metabolic Panel [[id number]]  (Abnormal) Collected: 05/30/25 1225    Specimen: Blood Updated: 05/30/25 1252     Glucose 92 mg/dL      BUN 12.2 mg/dL      Creatinine 0.64 mg/dL      Sodium 136 mmol/L      Potassium 4.3 mmol/L      Chloride 102 mmol/L      CO2 24.1 mmol/L      Calcium 9.2 mg/dL      Total Protein 7.2 g/dL      Albumin 4.2 g/dL      ALT (SGPT) 49 U/L      AST (SGOT) 28 U/L      Alkaline Phosphatase 111 U/L      Total Bilirubin <0.2 mg/dL      Globulin 3.0 gm/dL      A/G Ratio 1.4 g/dL      BUN/Creatinine Ratio 19.1     Anion Gap 9.9 mmol/L      eGFR 109.8 mL/min/1.73     Narrative:      GFR Categories in Chronic Kidney Disease (CKD)              GFR Category          GFR (mL/min/1.73)    Interpretation  G1                    90 or greater        " Normal or high (1)  G2                    60-89                Mild decrease (1)  G3a                   45-59                Mild to moderate decrease  G3b                   30-44                Moderate to severe decrease  G4                    15-29                Severe decrease  G5                    14 or less           Kidney failure    (1)In the absence of evidence of kidney disease, neither GFR category G1 or G2 fulfill the criteria for CKD.    eGFR calculation 2021 CKD-EPI creatinine equation, which does not include race as a factor    CBC & Differential [[id number]]  (Abnormal) Collected: 05/30/25 1225    Specimen: Blood Updated: 05/30/25 1234    Narrative:      The following orders were created for panel order CBC & Differential.  Procedure                               Abnormality         Status                     ---------                               -----------         ------                     CBC Auto Differential[[id number]]        Abnormal            Final result                 Please view results for these tests on the individual orders.    CBC Auto Differential [[id number]]  (Abnormal) Collected: 05/30/25 1225    Specimen: Blood Updated: 05/30/25 1234     WBC 13.04 10*3/mm3      RBC 4.06 10*6/mm3      Hemoglobin 12.5 g/dL      Hematocrit 38.8 %      MCV 95.6 fL      MCH 30.8 pg      MCHC 32.2 g/dL      RDW 13.3 %      RDW-SD 47.2 fl      MPV 9.3 fL      Platelets 346 10*3/mm3      Neutrophil % 70.2 %      Lymphocyte % 18.3 %      Monocyte % 9.6 %      Eosinophil % 1.1 %      Basophil % 0.4 %      Immature Grans % 0.4 %      Neutrophils, Absolute 9.17 10*3/mm3      Lymphocytes, Absolute 2.38 10*3/mm3      Monocytes, Absolute 1.25 10*3/mm3      Eosinophils, Absolute 0.14 10*3/mm3      Basophils, Absolute 0.05 10*3/mm3      Immature Grans, Absolute 0.05 10*3/mm3      nRBC 0.0 /100 WBC              Imaging:    XR Elbow 3+ View Right  Result Date: 5/30/2025  XR ELBOW 3+ VW RIGHT Date of Exam:  5/30/2025 12:35 PM EDT Indication: pain Comparison: Right elbow radiograph 5/29/2025. Findings: Elbow joint effusion is redemonstrated, manifest as elevation of the anterior posterior fat pads. No displaced fracture is seen. The elbow joint maintains satisfactory alignment. No retained opaque foreign body. No periostitis or plain film evidence of osteomyelitis.     Impression: Elbow joint effusion is redemonstrated. No displaced elbow fracture is identified. Electronically Signed: Funmi Mckeon MD  5/30/2025 1:09 PM EDT  Workstation ID: [id number]      MDM:    Procedures                         Vicente Peralta DO  18:08 EDT  05/30/25         Vicente Peralta DO  05/30/25 1802

## 2025-06-12 ENCOUNTER — OFFICE VISIT (OUTPATIENT)
Dept: ORTHOPEDIC SURGERY | Facility: CLINIC | Age: 45
End: 2025-06-12
Payer: COMMERCIAL

## 2025-06-12 VITALS — DIASTOLIC BLOOD PRESSURE: 83 MMHG | OXYGEN SATURATION: 96 % | HEART RATE: 81 BPM | SYSTOLIC BLOOD PRESSURE: 141 MMHG

## 2025-06-12 DIAGNOSIS — M77.11 LATERAL EPICONDYLITIS OF RIGHT ELBOW: Primary | ICD-10-CM

## 2025-06-12 DIAGNOSIS — M25.521 RIGHT ELBOW PAIN: ICD-10-CM

## 2025-06-12 RX ORDER — DICLOFENAC SODIUM 75 MG/1
75 TABLET, DELAYED RELEASE ORAL 2 TIMES DAILY
Qty: 60 TABLET | Refills: 1 | Status: SHIPPED | OUTPATIENT
Start: 2025-06-12

## 2025-06-12 NOTE — PROGRESS NOTES
Chief Complaint  Follow-up of the Right Elbow     Subjective      Lauren Lopez presents to [facility] for initial evaluation of the right elbow.  She went to the ED on 5/30/35.  She has had no injury or fall.  She has decrease ROM of the elbow.  The pain is on the medial aspect of the elbow. She had X rays.        Allergies   Allergen Reactions    Morphine Itching        Social History     Socioeconomic History    Marital status:    Tobacco Use    Smoking status: Former     Current packs/day: 1.00     Average packs/day: 1 pack/day for 20.0 years (20.0 ttl pk-yrs)     Types: Cigarettes    Smokeless tobacco: Never   Vaping Use    Vaping status: Former    Substances: Nicotine    Devices: Refillable tank   Substance and Sexual Activity    Alcohol use: Not Currently    Drug use: Not Currently     Types: Marijuana    Sexual activity: Defer        I reviewed the patient's chief complaint, history of present illness, review of systems, past medical history, surgical history, family history, social history, medications, and allergy list.     Review of Systems     Constitutional: Denies fevers, chills, weight loss  Cardiovascular: Denies chest pain, shortness of breath  Skin: Denies rashes, acute skin changes  Neurologic: Denies headache, loss of consciousness        Vital Signs:   /83   Pulse 81   SpO2 96%          Physical Exam  General: Alert. No acute distress    Ortho Exam        RIGHT ELBOW Non-tender lateral epicondyle. Tender medial epicondyle. Non-tender radial head. Sensation to light touch median, radial, ulnar nerve. Positive AIN, PIN, ulnar nerve motor function. Axillary sensation intact. Elbow ROM -3-140. Negative Tinel's at the elbow. Pain with resisted wrist and long finger extension. Mild effusion.          Procedures      Imaging Results (Most Recent)       None             Result Review :         XR Elbow 3+ View Right  Result Date: 5/30/2025  Narrative: XR  ELBOW 3+ VW RIGHT Date of Exam: 5/30/2025 12:35 PM EDT Indication: pain Comparison: Right elbow radiograph 5/29/2025. Findings: Elbow joint effusion is redemonstrated, manifest as elevation of the anterior posterior fat pads. No displaced fracture is seen. The elbow joint maintains satisfactory alignment. No retained opaque foreign body. No periostitis or plain film evidence of osteomyelitis.     Impression: Impression: Elbow joint effusion is redemonstrated. No displaced elbow fracture is identified. Electronically Signed: Funmi Mckeon MD  5/30/2025 1:09 PM EDT  Workstation ID: [id number]    XR Elbow 3+ View Right  Result Date: 5/29/2025  Narrative: XR ELBOW 3+ VW RIGHT Date of Exam: 5/29/2025 3:50 PM EDT Indication: Pain, decreased range of motion, pain with extension Comparison: None available. Findings: There is an elbow joint effusion. Normal bone mineralization. No acute fracture or traumatic malalignment. Joint spaces are preserved. No discrete erosion or periostitis.     Impression: Impression: Elbow joint effusion. Otherwise unremarkable appearance of the elbow. Electronically Signed: Geo Powell MD  5/29/2025 4:01 PM EDT  Workstation ID: [id number]             Assessment and Plan     Diagnoses and all orders for this visit:    1. Lateral epicondylitis of right elbow (Primary)  -     diclofenac (VOLTAREN) 75 MG EC tablet; Take 1 tablet by mouth 2 (Two) Times a Day.  Dispense: 60 tablet; Refill: 1    2. Right elbow pain        Discussed the treatment plan with the patient. I reviewed the X-rays that were obtained 5/30/25 with the patient.        Prescribed anti inflammatory and HEP exercises for medial epicondylitis.       Call or return if worsening symptoms.    Follow Up     PRN      Patient was given instructions and counseling regarding her condition or for health maintenance advice. Please see specific information pulled into the AVS if appropriate.     Scribed for Ki Dejesus MD by Liberty  JAQUELINE Franco.  06/12/25   11:11 EDT    I have personally performed the services described in this document as scribed by the above individual and it is both accurate and complete. Ki Dejesus MD 06/12/25

## 2025-07-11 ENCOUNTER — HOSPITAL ENCOUNTER (OUTPATIENT)
Dept: BONE DENSITY | Facility: HOSPITAL | Age: 45
Discharge: HOME | End: 2025-07-11
Payer: COMMERCIAL

## 2025-07-11 ENCOUNTER — HOSPITAL ENCOUNTER (OUTPATIENT)
Dept: MAMMOGRAPHY | Facility: HOSPITAL | Age: 45
Discharge: HOME | End: 2025-07-11
Payer: COMMERCIAL

## 2025-07-11 DIAGNOSIS — Z13.820 SCREENING FOR OSTEOPOROSIS: ICD-10-CM

## 2025-07-11 DIAGNOSIS — Z12.31 VISIT FOR SCREENING MAMMOGRAM: ICD-10-CM

## 2025-07-11 PROCEDURE — 77063 BREAST TOMOSYNTHESIS BI: CPT

## 2025-07-11 PROCEDURE — 77080 DXA BONE DENSITY AXIAL: CPT

## 2025-07-11 PROCEDURE — 77067 SCR MAMMO BI INCL CAD: CPT

## (undated) DEVICE — PROXIMATE RH ROTATING HEAD SKIN STAPLERS (35 WIDE) CONTAINS 35 STAINLESS STEEL STAPLES: Brand: PROXIMATE

## (undated) DEVICE — DRSNG WND GZ CURAD OIL EMULSION 3X3IN STRL

## (undated) DEVICE — PENCL E/S SMOKEEVAC W/TELESCP CANN

## (undated) DEVICE — BNDG ELAS ECON W/CLIP 4IN 5YD LF STRL

## (undated) DEVICE — 3M™ STERI-DRAPE™ X-RAY IMAGE INTENSIFIER DRAPE, 10 PER CARTON / 4 CARTONS PER CASE, 1013: Brand: STERI-DRAPE™

## (undated) DEVICE — BNDG ESMARK 4IN 12FT LF STRL BLU

## (undated) DEVICE — BNDG ELAS CO-FLEX SLF ADHR 6IN 5YD LF STRL

## (undated) DEVICE — BNDG ELAS MATRX V/CLS 6INX10YD LF

## (undated) DEVICE — GLV SURG ULTRAFREE MAX PF LTX SZ9

## (undated) DEVICE — SINGLE USE DEVICE INTENDED TO COVER EXPOSED ENDS OF ORTHOPEDIC PIN AND K-WIRES TO HELP PROTECT THE EXPOSED WIRE FROM SNAGGING ON CLOTHING.: Brand: OXBORO™ PIN COVER

## (undated) DEVICE — UNDERCAST PADDING: Brand: DEROYAL

## (undated) DEVICE — INTENDED FOR TISSUE SEPARATION, AND OTHER PROCEDURES THAT REQUIRE A SHARP SURGICAL BLADE TO PUNCTURE OR CUT.: Brand: BARD-PARKER ® CARBON RIB-BACK BLADES

## (undated) DEVICE — GOWN,REINF,POLY,SIRUS,BRTH SLV,XLNG/XXL: Brand: MEDLINE

## (undated) DEVICE — GAUZE,SPONGE,4"X4",16PLY,STRL,LF,10/TRAY: Brand: MEDLINE

## (undated) DEVICE — APPL CHLORAPREP HI/LITE 26ML ORNG

## (undated) DEVICE — SYR LL TP 10ML STRL

## (undated) DEVICE — BANDAGE,GAUZE,BULKEE II,4.5"X4.1YD,STRL: Brand: MEDLINE

## (undated) DEVICE — EXTREMITY-LF: Brand: MEDLINE INDUSTRIES, INC.

## (undated) DEVICE — Device

## (undated) DEVICE — NDL HYPO ECLPS SFTY 22G 1 1/2IN

## (undated) DEVICE — BNDG ELAS CO-FLEX SLF ADHR 4IN5YD LF STRL

## (undated) DEVICE — GLV SURG SENSICARE SLT PF LF 9 STRL